# Patient Record
Sex: FEMALE | Race: WHITE
[De-identification: names, ages, dates, MRNs, and addresses within clinical notes are randomized per-mention and may not be internally consistent; named-entity substitution may affect disease eponyms.]

---

## 2017-05-17 ENCOUNTER — HOSPITAL ENCOUNTER (OUTPATIENT)
Dept: HOSPITAL 47 - RADXRYALE | Age: 22
Discharge: HOME | End: 2017-05-17
Attending: PHYSICIAN ASSISTANT
Payer: COMMERCIAL

## 2017-05-17 DIAGNOSIS — M22.41: Primary | ICD-10-CM

## 2017-05-18 NOTE — XR
EXAMINATION TYPE: XR knee complete RT

 

DATE OF EXAM: 5/17/2017 9:57 AM

 

COMPARISON: NONE

 

HISTORY: 22 year-old female right knee pain, chondromalacia

 

TECHNIQUE: 3 views

 

FINDINGS: 

Joint spaces are well-maintained. No acute fracture, subluxation, dislocation. Trace knee joint effus
ion may be present. Extensor mechanism is intact.

 

IMPRESSION: 

No acute osseous abnormality seen. There may be a trace knee joint effusion.

## 2018-05-22 ENCOUNTER — HOSPITAL ENCOUNTER (OUTPATIENT)
Dept: HOSPITAL 47 - FBPOP | Age: 23
Discharge: HOME | End: 2018-05-22
Payer: COMMERCIAL

## 2018-05-22 VITALS
TEMPERATURE: 97 F | HEART RATE: 92 BPM | SYSTOLIC BLOOD PRESSURE: 118 MMHG | RESPIRATION RATE: 18 BRPM | DIASTOLIC BLOOD PRESSURE: 65 MMHG

## 2018-05-22 DIAGNOSIS — O99.89: Primary | ICD-10-CM

## 2018-05-22 DIAGNOSIS — Z3A.21: ICD-10-CM

## 2018-05-22 DIAGNOSIS — R10.9: ICD-10-CM

## 2018-05-22 LAB
PH UR: 7.5 [PH] (ref 5–8)
RBC UR QL: 6 /HPF (ref 0–5)
SP GR UR: 1.01 (ref 1–1.03)
SQUAMOUS UR QL AUTO: 30 /HPF (ref 0–4)
UROBILINOGEN UR QL STRIP: <2 MG/DL (ref ?–2)
WBC #/AREA URNS HPF: 9 /HPF (ref 0–5)

## 2018-05-22 PROCEDURE — 99213 OFFICE O/P EST LOW 20 MIN: CPT

## 2018-05-22 PROCEDURE — 81001 URINALYSIS AUTO W/SCOPE: CPT

## 2018-05-22 PROCEDURE — 87086 URINE CULTURE/COLONY COUNT: CPT

## 2018-05-22 PROCEDURE — 59025 FETAL NON-STRESS TEST: CPT

## 2018-05-22 NOTE — P.MSEPDOC
Presenting Problems





- Arrival Data


Date of Arrival on Unit: 18


Time of Arrival on Unit: 10:29


Mode of Transport: Ambulatory





- Complaint


OB-Reason for Admission/Chief Complaint: Pain





Prenatal Medical History





- Pregnancy Information


: 2


Para: 0


Term: 0


: 0


Abortions: Spontaneous or Elective: 0


Number of Living Children: 0





- Gestational Age


Gestational Age by JAZLYN (wks/days): 21 Weeks and 4 Days





Review of Systems





- Review of Systems


Constitutional: No problems


Breast: No problems


ENT: No problems


Cardiovascular: No problems


Respiratory: No problems


Gastrointestinal: No problems


Genitourinary: No problems


Musculoskeletal: No problems


Neurological: No problems


Skin: No problems





Vital Signs





- Temperature


Temperature: 97.0 F


Temperature Source: Temporal Artery Scan





- Pulse


  ** Right Brachial


Pulse Rate: 92





- Respirations


Respiratory Rate: 18


Oxygen Delivery Method: Room Air





- Blood Pressure


  ** Right Arm


Blood Pressure: 118/65


Blood Pressure Mean: 82


Blood Pressure Source: Automatic Cuff





Medical Screen Scoring (Pre)





- Cervical Exam


Dilation: Exam Deferred


Effacement: Exam Deferred


Membranes: Intact





- Uterine Contractions


Frequency: N/A


Duration: N/A


Intensity: N/A





- Maternal Vital Signs


Maternal Temperature: N/A


Maternal Respirations: N/A





- Maternal Trauma


Maternal Trauma: N/A





- Fetal Assessment


Fetal Heart Rate - NICHD Category: Category I (Normal) = 0





- Total Score


Total Score (Pre): 0





- Level of Risk


Level of Risk: N/A





Physician Notification (Pre)





- Physician Notified


Physician Notified Date: 18


Physician Notified Time: 11:35


Physician/Practitioner Notifed:: Dr. Everett


Spoke With: Dr. Everett


New Order Received: Yes





- Notification Comment


Comment: send order for urine culture, may discharge pt home follow up in 

office at scheduled appt on  at scheduled appt





Medical Screen Scoring (Post)





- Cervical Exam


Dilation: 1-3 cm = 1


Effacement: More than 50% = 2


Membranes: Intact





- Uterine Contractions


Frequency: > 5 minutes apart = 1


Duration: > 40 seconds = 2


Intensity: N/A





- Maternal Vital Signs


Maternal Temperature: N/A


Maternal Blood Pressure: N/A


Signs of Preeclampsia: N/A





- Fetal Assessment


Fetal Heart Rate: 140


Fetal Heart Rate - NICHD Category: Category I (Normal) = 0


NST: Reactive


Fetal Position: N/A


Fetal Station: N/A





- Total Score


Total Score (Post): 6





- Post Treatment Level of Risk


Post Treatment Level of Risk: Medium (6-9)





Physician Notification (Post)





- Physician Notified


Physician Notified Date: 18


Physician Notified Time: 10:49


Physician/Practitioner Notified:: Dr. Yost


Spoke With: Dr. Yost


New Order Received: Yes





- Notification Comment


Comment: discharge pt home, follow up in office tomorrow at scheduled appt





Disposition





- Disposition


OB Disposition: Triage, Discharge to home, Written follow up instructions 

reviewed


Discharge Date: 18


Discharge Time: 11:50


I agree with the RN Medical Screening Exam: Yes


Risk & Benefit of care provided described in d/c instruction: Yes


Diagnosis: UNSPECIFIED ABDOMINAL PAIN

## 2018-05-25 ENCOUNTER — HOSPITAL ENCOUNTER (EMERGENCY)
Dept: HOSPITAL 47 - EC | Age: 23
Discharge: HOME | End: 2018-05-25
Payer: COMMERCIAL

## 2018-05-25 VITALS
RESPIRATION RATE: 18 BRPM | DIASTOLIC BLOOD PRESSURE: 58 MMHG | SYSTOLIC BLOOD PRESSURE: 119 MMHG | HEART RATE: 99 BPM | TEMPERATURE: 97.4 F

## 2018-05-25 DIAGNOSIS — Z88.1: ICD-10-CM

## 2018-05-25 DIAGNOSIS — Z3A.22: ICD-10-CM

## 2018-05-25 DIAGNOSIS — Z88.2: ICD-10-CM

## 2018-05-25 DIAGNOSIS — O99.512: Primary | ICD-10-CM

## 2018-05-25 DIAGNOSIS — Z88.8: ICD-10-CM

## 2018-05-25 DIAGNOSIS — J06.9: ICD-10-CM

## 2018-05-25 PROCEDURE — 99283 EMERGENCY DEPT VISIT LOW MDM: CPT

## 2018-05-25 PROCEDURE — 71046 X-RAY EXAM CHEST 2 VIEWS: CPT

## 2018-05-25 NOTE — ED
General Adult HPI





- General


Chief complaint: Upper Respiratory Infection


Stated complaint: Vomiting


Time Seen by Provider: 05/25/18 21:44


Source: patient, RN notes reviewed


Mode of arrival: ambulatory


Limitations: no limitations





- History of Present Illness


Initial comments: 





23-year-old female presents to the emergency room for a chief complaint of 

cough times one week. Patient is currently 22 weeks pregnant. Patient states 

she has been coughing up phlegm and has nasal congestion.  Patient states she 

has had low-grade fevers at home.  Patient states she also has a sore throat 

but has been swabbed for strep 3 days ago and was negative and does not want 

another swab.  Patient has been taking Robitussin and benadryl as prescribed by 

her family doctor.  Patient denies shortness of breath or difficulty breathing. 

Patient denies any pregnancy related complaints.  Patient has no other 

complaints at this time including shortness of breath, chest pain, abdominal 

pain, nausea or vomiting, headache, or visual changes. 





- Related Data


 Home Medications











 Medication  Instructions  Recorded  Confirmed


 


Albuterol Inhaler [Ventolin Hfa 1 puff INHALATION Q4H PRN 07/15/14 05/22/18





Inhaler]   


 


Pnv No.95/Ferrous Fum/Folic AC 1 each PO DAILY 05/22/18 05/22/18





[Prenatal Multivitamin Tablet]   











 Allergies











Allergy/AdvReac Type Severity Reaction Status Date / Time


 


methylphenidate HCl Allergy  Unknown Verified 05/25/18 21:43





[From Metadate CD]     


 


sulfamethoxazole Allergy  Rash/Hives Verified 05/25/18 21:43





[From Bactrim]     


 


trimethoprim [From Bactrim] Allergy  Rash/Hives Verified 05/25/18 21:43


 


cephalexin [From Keflex] AdvReac  Nausea & Verified 05/25/18 21:43





   Vomiting  














Review of Systems


ROS Statement: 


Those systems with pertinent positive or pertinent negative responses have been 

documented in the HPI.





ROS Other: All systems not noted in ROS Statement are negative.





Past Medical History


Past Medical History: No Reported History


Additional Past Medical History / Comment(s): hypoglycemia


History of Any Multi-Drug Resistant Organisms: None Reported


Past Surgical History: Tonsillectomy


Additional Past Surgical History / Comment(s): oral surgery


Past Psychological History: Anxiety


Smoking Status: Never smoker


Past Alcohol Use History: None Reported


Past Drug Use History: None Reported





General Exam


Limitations: no limitations


General appearance: alert, in no apparent distress


Eye exam: Present: normal appearance, PERRL, EOMI.  Absent: scleral icterus, 

conjunctival injection, periorbital swelling


ENT exam: Present: normal exam, normal oropharynx, mucous membranes moist, TM's 

normal bilaterally


Neck exam: Present: normal inspection, full ROM.  Absent: tenderness, 

meningismus, lymphadenopathy, thyromegaly


Respiratory exam: Present: normal lung sounds bilaterally.  Absent: respiratory 

distress, wheezes, rales, rhonchi, stridor


Cardiovascular Exam: Present: regular rate, normal rhythm, normal heart sounds.

  Absent: systolic murmur, diastolic murmur, rubs, gallop, clicks





Course


 Vital Signs











  05/25/18





  21:40


 


Temperature 97.4 F L


 


Pulse Rate 99


 


Respiratory 18





Rate 


 


Blood Pressure 119/58


 


O2 Sat by Pulse 99





Oximetry 














Medical Decision Making





- Medical Decision Making





23-year-old female presents to the emergency department for a chief complaint 

of productive cough for one week. patient is 22 weeks pregnant. Patient also 

has a sore throat but was already swabbed for strep which was negative.  She 

does not want to be swabbed again.  No fevers in the emergency department.  On 

exam lungs are clear.  Throat is nonerythematous.  Patient appears sightly 

congested.  No redness or swelling in the facial sinuses.  Patient has been 

taking Robitussin and Benadryl.  Patient is upset because she is missing work.  

X-ray was ordered of the chest which shows no acute abnormalities or 

pneumonias.  Patient was shielded.  Patient was educated that this is likely an 

upper respiratory viral infection which may last for up to another week.  

Patient is upset we are not giving her an antibiotic, as her other doctor also 

did not give her one.  However, at this point antibiotics are not indicated.  I 

explained to her that we are trying to prevent exposure to the fetus of 

unnecessary medications and she is appreciative of that. patient will continue 

benadryl and robitussin given by another provider. Patient agrees to follow up 

with OB/GYN.  She will return to the emergency Department if she has any 

worsening symptoms or high fevers.





Disposition


Clinical Impression: 


 Upper respiratory infection





Disposition: HOME SELF-CARE


Condition: Good


Instructions:  Upper Respiratory Infection (ED)


Additional Instructions: 


Please take medications given to by her OB/GYN.  Please return to the emergency 

department if symptoms worsen or you develop high fevers.  Otherwise follow-up 

with OB/GYN.


Is patient prescribed a controlled substance at d/c from ED?: No


Referrals: 


Ilia Holt DO [Primary Care Provider] - 1-2 days


Time of Disposition: 22:42

## 2018-05-25 NOTE — XR
EXAMINATION TYPE: XR chest 2V

 

DATE OF EXAM: 5/25/2018

 

COMPARISON: NONE

 

HISTORY: Cough and congestion

 

TECHNIQUE:  Frontal and lateral views of the chest are obtained.

 

FINDINGS:  Heart and mediastinum are normal. Lungs are clear. Diaphragm is normal. Bony thorax appear
s normal.

 

IMPRESSION:  Normal chest

## 2018-08-03 ENCOUNTER — HOSPITAL ENCOUNTER (OUTPATIENT)
Dept: HOSPITAL 47 - FBPOP | Age: 23
Discharge: HOME | End: 2018-08-03
Attending: OBSTETRICS & GYNECOLOGY
Payer: COMMERCIAL

## 2018-08-03 VITALS
TEMPERATURE: 97.8 F | SYSTOLIC BLOOD PRESSURE: 125 MMHG | HEART RATE: 91 BPM | RESPIRATION RATE: 17 BRPM | DIASTOLIC BLOOD PRESSURE: 70 MMHG

## 2018-08-03 DIAGNOSIS — O47.03: Primary | ICD-10-CM

## 2018-08-03 DIAGNOSIS — O99.333: ICD-10-CM

## 2018-08-03 DIAGNOSIS — Z3A.32: ICD-10-CM

## 2018-08-03 LAB
PH UR: 7.5 [PH] (ref 5–8)
RBC UR QL: 1 /HPF (ref 0–5)
SP GR UR: 1.01 (ref 1–1.03)
SQUAMOUS UR QL AUTO: 18 /HPF (ref 0–4)
UROBILINOGEN UR QL STRIP: <2 MG/DL (ref ?–2)
WBC #/AREA URNS HPF: <1 /HPF (ref 0–5)

## 2018-08-03 PROCEDURE — 81001 URINALYSIS AUTO W/SCOPE: CPT

## 2018-08-04 NOTE — P.MSEPDOC
Presenting Problems





- Arrival Data


Date of Arrival on Unit: 18


Time of Arrival on Unit: 15:18


Mode of Transport: Ambulatory





- Complaint


OB-Reason for Admission/Chief Complaint: Rule Out PROM


Comment: pt here with c/o large gush of fluid after sneezing around 1430 today, 

pt.  unsure if it was urine or rom, pt reports + fm, denies vb, denies 

complications with.  pregnancy, reports occasional cramping in lower abd late 

began today, pt unsure if they.  are contractions reporting they only last a 

few seconds when they occur, pt denies.  intercourse in last 24 hours





Prenatal Medical History





- Pregnancy Information


: 2


Para: 0


Term: 0


: 0


Abortions: Spontaneous or Elective: 1


Number of Living Children: 0





- Gestational Age


Gestational Age by JAZLYN (wks/days): 32 Weeks and 0 Days





- Prenatal History


Pregnancy Complications: Smoker





Review of Systems





- Review of Systems


Constitutional: No problems


Breast: No problems


ENT: No problems


Cardiovascular: No problems


Respiratory: No problems


Gastrointestinal: No problems


Genitourinary: No problems


Musculoskeletal: No problems


Neurological: No problems


Skin: No problems





Vital Signs





- Temperature


Temperature: 97.8 F


Temperature Source: Temporal Artery Scan





- Pulse


  ** Right Brachial


Pulse Rate: 91


Pulse Assessment Method: Automatic Cuff





- Respirations


Respiratory Rate: 17


Oxygen Delivery Method: Room Air


O2 Sat by Pulse Oximetry: 97





- Blood Pressure


  ** Right Arm


Blood Pressure: 125/70


Blood Pressure Mean: 88


Blood Pressure Source: Automatic Cuff





Medical Screen Scoring (Pre)





- Cervical Exam


Dilation: 0 cm = 0


Effacement: Exam Deferred


Membranes: Intact





- Uterine Contractions


Frequency: N/A


Duration: N/A


Intensity: N/A





- Maternal Vital Signs


Maternal Temperature: N/A


Maternal Blood Pressure: N/A


Signs of Preeclampsia: N/A


Maternal Respirations: N/A





- Pain Assessment


Pain Location and Character: Abdomen


Pain Scale Used: Numeric (1 - 10)


Pain Intensity: 4


Pain Management Goal: 3


Pain Description: *Acute, Aching, Crushing


Pain Radiation Location: none


Pain Frequency: Occasional


Pain Duration: 1


Pain Duration Units: Hours


Pain Behavior: None Exhibited


Pain Aggravating Factors: None





- Maternal Trauma


Maternal Trauma: N/A





- Fetal Assessment


Baseline FHR: 130


Fetal Heart Rate - NICHD Category: Category I (Normal) = 0


NST: Reactive


Fetal Position: N/A


Fetal Station: N/A





- Total Score


Total Score (Pre): 0





- Level of Risk


Level of Risk: Low (0-5)





Physician Notification (Pre)





- Physician Notified


Physician Notified Date: 18


Physician Notified Time: 16:12


Physician/Practitioner Notifed:: Dr Martinez


Spoke With: Dr Martinez


New Order Received: Yes (discharge home)





- Notification Comment


Comment: amnisure results -, u/a results -, pt reports decrease in pain at this 

time





Disposition





- Disposition


OB Disposition: Discharge to home, Written follow up instructions reviewed


Discharge Date: 18


Discharge Time: 16:20


I agree with the RN Medical Screening Exam: Yes


Risk & Benefit of care provided described in d/c instruction: Yes


Diagnosis: FALSE LABOR BEFORE 37 COMPLETED WEEKS OF GEST, THIRD TRI

## 2018-09-12 ENCOUNTER — HOSPITAL ENCOUNTER (INPATIENT)
Dept: HOSPITAL 47 - 4FBP | Age: 23
LOS: 2 days | Discharge: HOME | End: 2018-09-14
Attending: OBSTETRICS & GYNECOLOGY | Admitting: OBSTETRICS & GYNECOLOGY
Payer: COMMERCIAL

## 2018-09-12 VITALS — BODY MASS INDEX: 27.4 KG/M2

## 2018-09-12 DIAGNOSIS — Z88.8: ICD-10-CM

## 2018-09-12 DIAGNOSIS — Z88.2: ICD-10-CM

## 2018-09-12 DIAGNOSIS — Z83.49: ICD-10-CM

## 2018-09-12 DIAGNOSIS — K83.1: ICD-10-CM

## 2018-09-12 DIAGNOSIS — F41.9: ICD-10-CM

## 2018-09-12 DIAGNOSIS — Z3A.37: ICD-10-CM

## 2018-09-12 DIAGNOSIS — Z82.5: ICD-10-CM

## 2018-09-12 DIAGNOSIS — Z88.1: ICD-10-CM

## 2018-09-12 LAB
BASOPHILS # BLD AUTO: 0.1 K/UL (ref 0–0.2)
BASOPHILS NFR BLD AUTO: 1 %
EOSINOPHIL # BLD AUTO: 0.2 K/UL (ref 0–0.7)
EOSINOPHIL NFR BLD AUTO: 2 %
ERYTHROCYTE [DISTWIDTH] IN BLOOD BY AUTOMATED COUNT: 4 M/UL (ref 3.8–5.4)
ERYTHROCYTE [DISTWIDTH] IN BLOOD: 13.8 % (ref 11.5–15.5)
HCT VFR BLD AUTO: 35.4 % (ref 34–46)
HGB BLD-MCNC: 11.2 GM/DL (ref 11.4–16)
LYMPHOCYTES # SPEC AUTO: 2.4 K/UL (ref 1–4.8)
LYMPHOCYTES NFR SPEC AUTO: 23 %
MCH RBC QN AUTO: 28 PG (ref 25–35)
MCHC RBC AUTO-ENTMCNC: 31.6 G/DL (ref 31–37)
MCV RBC AUTO: 88.5 FL (ref 80–100)
MONOCYTES # BLD AUTO: 0.8 K/UL (ref 0–1)
MONOCYTES NFR BLD AUTO: 7 %
NEUTROPHILS # BLD AUTO: 6.8 K/UL (ref 1.3–7.7)
NEUTROPHILS NFR BLD AUTO: 65 %
PLATELET # BLD AUTO: 214 K/UL (ref 150–450)
WBC # BLD AUTO: 10.4 K/UL (ref 3.8–10.6)

## 2018-09-12 PROCEDURE — 85027 COMPLETE CBC AUTOMATED: CPT

## 2018-09-12 PROCEDURE — 85025 COMPLETE CBC W/AUTO DIFF WBC: CPT

## 2018-09-12 RX ADMIN — BUTORPHANOL TARTRATE PRN MG: 1 INJECTION, SOLUTION INTRAMUSCULAR; INTRAVENOUS at 15:17

## 2018-09-12 RX ADMIN — AMPICILLIN SODIUM SCH MLS/HR: 1 INJECTION, POWDER, FOR SOLUTION INTRAMUSCULAR; INTRAVENOUS at 10:56

## 2018-09-12 RX ADMIN — POTASSIUM CHLORIDE SCH MLS/HR: 14.9 INJECTION, SOLUTION INTRAVENOUS at 21:54

## 2018-09-12 RX ADMIN — BUTORPHANOL TARTRATE PRN MG: 1 INJECTION, SOLUTION INTRAMUSCULAR; INTRAVENOUS at 12:33

## 2018-09-12 RX ADMIN — AMPICILLIN SODIUM SCH MLS/HR: 1 INJECTION, POWDER, FOR SOLUTION INTRAMUSCULAR; INTRAVENOUS at 14:55

## 2018-09-12 RX ADMIN — POTASSIUM CHLORIDE SCH MLS/HR: 14.9 INJECTION, SOLUTION INTRAVENOUS at 16:58

## 2018-09-12 RX ADMIN — AMPICILLIN SODIUM SCH MLS/HR: 1 INJECTION, POWDER, FOR SOLUTION INTRAMUSCULAR; INTRAVENOUS at 19:17

## 2018-09-12 RX ADMIN — POTASSIUM CHLORIDE SCH MLS/HR: 14.9 INJECTION, SOLUTION INTRAVENOUS at 10:51

## 2018-09-12 RX ADMIN — POTASSIUM CHLORIDE SCH MLS/HR: 14.9 INJECTION, SOLUTION INTRAVENOUS at 06:30

## 2018-09-12 NOTE — P.HPOB
History of Present Illness


H&P Date: 18


Chief Complaint: Induction of labor





23-year-old  presents at 37 weeks and 5 days for induction of labor due to 

cholestasis of pregnancy.  Her cervix is 1-2 cm dilated, 70% effaced, -2 

station.  She is neville irregularly.  Fetal heart tones were in 130-135 

with moderate variability and reactive. 





Review of Systems


All systems: negative


Constitutional: Denies chills, Denies fever


Eyes: denies blurred vision, denies pain


Ears, nose, mouth and throat: Denies headache, Denies sore throat


Cardiovascular: Denies chest pain, Denies shortness of breath


Respiratory: Denies cough


Gastrointestinal: Denies abdominal pain, Denies diarrhea, Denies nausea, Denies 

vomiting


Genitourinary: Denies dysuria, Denies hematuria


Musculoskeletal: Denies myalgias


Integumentary: Denies pruritus, Denies rash


Neurological: Denies numbness, Denies weakness


Psychiatric: Denies anxiety, Denies depression


Endocrine: Denies fatigue, Denies weight change





Past Medical History


Past Medical History: No Reported History


Additional Past Medical History / Comment(s): hypoglycemia, obstetrics history: 

This is her first pregnancy.  She's had prenatal care with me since the first 

trimester.  She weaned her for Adderall in the first couple weeks of her 

pregnancy.  She was diagnosed with cholestasis of pregnancy at 32 weeks 

gestation.


History of Any Multi-Drug Resistant Organisms: None Reported


Past Surgical History: Tonsillectomy


Additional Past Surgical History / Comment(s): oral surgery


Past Anesthesia/Blood Transfusion Reactions: No Reported Reaction


Past Psychological History: Anxiety


Smoking Status: Never smoker


Past Alcohol Use History: None Reported


Past Drug Use History: None Reported





- Past Family History


  ** Mother


Family Medical History: Asthma, Thyroid Disorder





Medications and Allergies


 Home Medications











 Medication  Instructions  Recorded  Confirmed  Type


 


Pnv No.95/Ferrous Fum/Folic AC 1 tab PO DAILY 18 History





[Prenatal Multivitamin Tablet]    


 


Ranitidine HCl [Zantac] 150 mg PO BID 18 History


 


Ursodiol 300 mg PO BID 18 History


 


diphenhydrAMINE [Benadryl] 25 mg PO AS DIRECTED 18 History











 Allergies











Allergy/AdvReac Type Severity Reaction Status Date / Time


 


methylphenidate HCl Allergy  Unknown Verified 18 06:26





[From Metadate CD]     


 


sulfamethoxazole Allergy  Rash/Hives Verified 18 06:26





[From Bactrim]     


 


trimethoprim [From Bactrim] Allergy  Rash/Hives Verified 18 06:26


 


cephalexin [From Keflex] AdvReac  Nausea & Verified 18 06:26





   Vomiting  














Exam


Osteopathic Statement: *.  No significant issues noted on an osteopathic 

structural exam other than those noted in the History and Physical/Consult.


 Vital Signs











  Temp Pulse Resp BP Pulse Ox


 


 18 06:27  97.6 F  83  16  142/96  98








 Intake and Output











 18





 22:59 06:59 14:59


 


Other:   


 


  Weight  68.039 kg 














Heart: Regular rate and rhythm


Lungs: Clear to auscultation bilaterally


Abdomen: Soft, nontender


Extremities: Negative Homans sign





Results


Result Diagrams: 


 18 06:25





 Abnormal Lab Results - Last 24 Hours (Table)











  18 Range/Units





  06:25 


 


Hgb  11.2 L  (11.4-16.0)  gm/dL














Assessment and Plan


(1) Normal labor


Current Visit: Yes   Status: Acute   Code(s): O80 - ENCOUNTER FOR FULL-TERM 

UNCOMPLICATED DELIVERY; Z37.9 - OUTCOME OF DELIVERY, UNSPECIFIED   SNOMED Code(s

): 25866696


   


Plan: 





1. Induction of labor with amniotomy and Pitocin


2.  Anticipate normal vaginal delivery


3.  Antibiotics for GBS prophylaxis

## 2018-09-13 LAB
ERYTHROCYTE [DISTWIDTH] IN BLOOD BY AUTOMATED COUNT: 3.21 M/UL (ref 3.8–5.4)
ERYTHROCYTE [DISTWIDTH] IN BLOOD: 13.7 % (ref 11.5–15.5)
HCT VFR BLD AUTO: 28.2 % (ref 34–46)
HGB BLD-MCNC: 9.2 GM/DL (ref 11.4–16)
MCH RBC QN AUTO: 28.8 PG (ref 25–35)
MCHC RBC AUTO-ENTMCNC: 32.7 G/DL (ref 31–37)
MCV RBC AUTO: 88 FL (ref 80–100)
PLATELET # BLD AUTO: 190 K/UL (ref 150–450)
WBC # BLD AUTO: 19 K/UL (ref 3.8–10.6)

## 2018-09-13 PROCEDURE — 0KQM0ZZ REPAIR PERINEUM MUSCLE, OPEN APPROACH: ICD-10-PCS

## 2018-09-13 PROCEDURE — 3E033VJ INTRODUCTION OF OTHER HORMONE INTO PERIPHERAL VEIN, PERCUTANEOUS APPROACH: ICD-10-PCS

## 2018-09-13 PROCEDURE — 3E0R3BZ INTRODUCTION OF ANESTHETIC AGENT INTO SPINAL CANAL, PERCUTANEOUS APPROACH: ICD-10-PCS

## 2018-09-13 PROCEDURE — 10907ZC DRAINAGE OF AMNIOTIC FLUID, THERAPEUTIC FROM PRODUCTS OF CONCEPTION, VIA NATURAL OR ARTIFICIAL OPENING: ICD-10-PCS

## 2018-09-13 PROCEDURE — 00HU33Z INSERTION OF INFUSION DEVICE INTO SPINAL CANAL, PERCUTANEOUS APPROACH: ICD-10-PCS

## 2018-09-13 RX ADMIN — DOCUSATE SODIUM AND SENNOSIDES SCH EACH: 50; 8.6 TABLET ORAL at 20:15

## 2018-09-13 RX ADMIN — URSODIOL SCH MG: 300 CAPSULE ORAL at 19:54

## 2018-09-13 RX ADMIN — AMPICILLIN SODIUM SCH: 1 INJECTION, POWDER, FOR SOLUTION INTRAMUSCULAR; INTRAVENOUS at 02:10

## 2018-09-13 RX ADMIN — IBUPROFEN PRN MG: 600 TABLET ORAL at 07:46

## 2018-09-13 RX ADMIN — DOCUSATE SODIUM AND SENNOSIDES SCH EACH: 50; 8.6 TABLET ORAL at 07:46

## 2018-09-13 RX ADMIN — IBUPROFEN PRN MG: 600 TABLET ORAL at 20:15

## 2018-09-13 RX ADMIN — IBUPROFEN PRN MG: 600 TABLET ORAL at 14:05

## 2018-09-13 RX ADMIN — IBUPROFEN PRN MG: 600 TABLET ORAL at 02:05

## 2018-09-13 NOTE — P.PROBDLV
Vaginal Delivery Note





- .


Vaginal Delivery Note: 





23-year-old  presents at 37 weeks and 5 days for induction of labor due to 

cholestasis of pregnancy.  Her cervix was 1-2 cm dilated, 80% effaced, and -2 

station.  She was neville irregularly.  Fetal heart tones 130-135 with 

moderate variability and reactive.  Amniotomy was performed at 7:45 AM, clear 

fluid noted.  Pitocin had been started.  Her cervix changed slowly through the 

day.  She did get an epidural around 5 PM, when she was 4 cm dilated.  Her 

cervix was completely dilated at 12:17 AM.  She pushed, and delivered a viable 

male infant over intact perineum under epidural anesthesia at 12:44 AM.  Head 

delivered OA, anterior shoulder delivered gentle downward guidance followed by 

posterior shoulder and rest of body.  Nose and mouth bulb suctioned, cord 

clamped and cut, infant placed mother's abdomen.  Apgars 9, 9, weight 7 lbs. 2 

oz.  Placenta delivered spontaneously, intact with three-vessel cord.  Vagina, 

cervix, perineum inspected.  Second-degree midline laceration was repaired with 

2-0 Vicryl.  Estimated blood loss 200 mL.  Mother and baby in stable condition.

## 2018-09-14 VITALS
DIASTOLIC BLOOD PRESSURE: 78 MMHG | RESPIRATION RATE: 16 BRPM | TEMPERATURE: 98.1 F | SYSTOLIC BLOOD PRESSURE: 135 MMHG | HEART RATE: 91 BPM

## 2018-09-14 RX ADMIN — DOCUSATE SODIUM AND SENNOSIDES SCH EACH: 50; 8.6 TABLET ORAL at 08:25

## 2018-09-14 RX ADMIN — URSODIOL SCH MG: 300 CAPSULE ORAL at 08:25

## 2018-09-17 NOTE — P.DS
Providers


Date of admission: 


09/12/18 05:55





Expected date of discharge: 09/14/18


Attending physician: 


Guadalupe Everett





Primary care physician: 


Stated None








- Discharge Diagnosis(es)


(1) Normal labor


Status: Resolved   





(2) Normal vaginal delivery


Status: Acute   


Hospital Course: 





Patient presented for induction of labor.  She underwent a normal vaginal 

delivery.  Postpartum she did have some uterine atony and vaginal bleeding.  

She received 1 dose of Methergine.  Her hemoglobin went from 11.2-9.2.  Her 

anemia was asymptomatic.  She will be discharged home postpartum day #1 in 

stable condition to follow-up with me in 6 weeks.


Patient Condition at Discharge: Good





Plan - Discharge Summary


New Discharge Prescriptions: 


New


   Ibuprofen [Motrin] 600 mg PO Q6HR PRN #30 tab


     PRN Reason: Mild Pain Or Fever >= 100.5





No Action


   Ursodiol 300 mg PO BID


   Ranitidine HCl [Zantac] 150 mg PO BID


   Pnv No.95/Ferrous Fum/Folic AC [Prenatal Multivitamin Tablet] 1 tab PO DAILY


   diphenhydrAMINE [Benadryl] 25 mg PO AS DIRECTED


Discharge Medication List





Pnv No.95/Ferrous Fum/Folic AC [Prenatal Multivitamin Tablet] 1 tab PO DAILY 08/ 26/18 [History]


Ranitidine HCl [Zantac] 150 mg PO BID 08/26/18 [History]


Ursodiol 300 mg PO BID 08/26/18 [History]


diphenhydrAMINE [Benadryl] 25 mg PO AS DIRECTED 08/26/18 [History]


Ibuprofen [Motrin] 600 mg PO Q6HR PRN #30 tab 09/14/18 [Rx]








Follow up Appointment(s)/Referral(s): 


Guadalupe Everett DO [Doctor of Osteopathic Medicine] - 6 Weeks


Discharge Disposition: HOME SELF-CARE

## 2018-09-18 ENCOUNTER — HOSPITAL ENCOUNTER (EMERGENCY)
Dept: HOSPITAL 47 - EC | Age: 23
Discharge: HOME | End: 2018-09-18
Payer: COMMERCIAL

## 2018-09-18 VITALS — DIASTOLIC BLOOD PRESSURE: 67 MMHG | TEMPERATURE: 97.1 F | SYSTOLIC BLOOD PRESSURE: 120 MMHG | HEART RATE: 85 BPM

## 2018-09-18 VITALS — RESPIRATION RATE: 18 BRPM

## 2018-09-18 DIAGNOSIS — Z88.1: ICD-10-CM

## 2018-09-18 DIAGNOSIS — Z88.2: ICD-10-CM

## 2018-09-18 DIAGNOSIS — M79.604: Primary | ICD-10-CM

## 2018-09-18 DIAGNOSIS — M79.661: ICD-10-CM

## 2018-09-18 DIAGNOSIS — M79.662: ICD-10-CM

## 2018-09-18 DIAGNOSIS — Z88.8: ICD-10-CM

## 2018-09-18 DIAGNOSIS — M79.652: ICD-10-CM

## 2018-09-18 DIAGNOSIS — Z79.899: ICD-10-CM

## 2018-09-18 PROCEDURE — 99283 EMERGENCY DEPT VISIT LOW MDM: CPT

## 2018-09-18 NOTE — ED
Extremity Problem HPI





- General


Chief complaint: Extremity Problem,Nontraumatic


Stated complaint: poss blood clot


Time Seen by Provider: 09/18/18 14:11


Source: patient, RN notes reviewed


Mode of arrival: ambulatory


Limitations: no limitations





- History of Present Illness


Initial comments: 


This is a 23-year-old female who presents to the emergency department with 

chief complaint of possible blood clot.  Patient states that she had a vaginal 

delivery last Thursday.  She states that since that time she has been 

experiencing sharp pains in both of her calves.  She states that this is normal 

for her.  However, she states that she is also experiencing a sharp stabbing 

pain that comes and goes in her left inner thigh.  She states that there is an 

area of tenderness.  She states that the pain lasts anywhere from 2 minutes at 

a time to 5 minutes at a time.  She states that she contacted her OB/GYN's 

office and they suggested she reports the emergency department for an 

ultrasound to rule out blood clot.  Patient denies any recent fevers or chills, 

chest pain shortness of breath, abdominal pain, nausea or vomiting.  Patient is 

not taking birth control as she is breast-feeding.








- Related Data


 Home Medications











 Medication  Instructions  Recorded  Confirmed


 


Pnv No.95/Ferrous Fum/Folic AC 1 tab PO DAILY 08/26/18 09/12/18





[Prenatal Multivitamin Tablet]   


 


Ranitidine HCl [Zantac] 150 mg PO BID 08/26/18 09/12/18


 


Ursodiol 300 mg PO BID 08/26/18 09/12/18


 


diphenhydrAMINE [Benadryl] 25 mg PO AS DIRECTED 08/26/18 09/12/18








 Previous Rx's











 Medication  Instructions  Recorded


 


Ibuprofen [Motrin] 600 mg PO Q6HR PRN #30 tab 09/14/18











 Allergies











Allergy/AdvReac Type Severity Reaction Status Date / Time


 


methylphenidate HCl Allergy  Unknown Verified 09/18/18 14:03





[From Metadate CD]     


 


sulfamethoxazole Allergy  Rash/Hives Verified 09/18/18 14:03





[From Bactrim]     


 


trimethoprim [From Bactrim] Allergy  Rash/Hives Verified 09/18/18 14:03


 


cephalexin [From Keflex] AdvReac  Nausea & Verified 09/18/18 14:03





   Vomiting  














Review of Systems


ROS Statement: 


Those systems with pertinent positive or pertinent negative responses have been 

documented in the HPI.





ROS Other: All systems not noted in ROS Statement are negative.





Past Medical History


Past Medical History: No Reported History


Additional Past Medical History / Comment(s): hypoglycemia, obstetrics history: 

This is her first pregnancy.  She's had prenatal care with me since the first 

trimester.  She weaned her for Adderall in the first couple weeks of her 

pregnancy.  She was diagnosed with cholestasis of pregnancy at 32 weeks 

gestation.


History of Any Multi-Drug Resistant Organisms: None Reported


Past Surgical History: Tonsillectomy


Additional Past Surgical History / Comment(s): oral surgery


Past Anesthesia/Blood Transfusion Reactions: No Reported Reaction


Past Psychological History: Anxiety


Smoking Status: Never smoker


Past Alcohol Use History: None Reported


Past Drug Use History: None Reported





- Past Family History


  ** Mother


Family Medical History: Asthma, Thyroid Disorder





General Exam





- General Exam Comments


Initial Comments: 


General: Awake and alert, well-developed; in no apparent distress.


HEENT: Head atraumatic, normocephalic. Pupils are equal, round and reactive to 

light. Extraocular movements intact. Oropharynx moist without erythema or 

exudate. 


Neck: Supple. Normal ROM. 


Cardiovascular: Regular rate and rhythm. No murmurs, rubs or gallops. Chest 

symmetrical.  


Respiratory: Lungs clear to auscultation bilaterally. No wheezes, rales or 

rhonchi. Normal respiratory effort with no use of accessory muscles. 


Musculoskeletal: Normal ROM, no tenderness bilateral upper and lower 

extremities.  Negative Homans sign.  No calf tenderness bilaterally.  No 

swelling, erythema or ecchymosis of bilateral lower extremities.  Ambulating 

normally. 


Skin: Pink, warm and dry without rashes or lesions. 


Neurological: Alert and oriented x3. CN II-XII grossly intact. Speech is fluent 

and answers are appropriate. No focal neuro deficits. 


Psychiatric: Normal mood and affect. No overt signs of depression or anxiety 

noted. 














Limitations: no limitations





Course


 Vital Signs











  09/18/18 09/18/18





  13:59 15:45


 


Temperature 98.0 F 97.1 F L


 


Pulse Rate 98 85


 


Respiratory 18 18





Rate  


 


Blood Pressure 128/72 120/67


 


O2 Sat by Pulse 100 100





Oximetry  














Medical Decision Making





- Medical Decision Making


This is a 23-year-old female who presents to the emergency department with 

chief complaint of possible blood clot.  Patient's OB/GYN office recommended 

she come to the emergency department for evaluation of possible blood clot.  

Patient had vaginal delivery last Thursday and has been having a pain in her 

left inner thigh.  Ultrasound was obtained which revealed no evidence for an 

acute DVT.  Her vital signs are stable and she is in no acute distress.  She 

will be discharged home at this time.  She is in agreement and voices 

understanding.  All questions answered.








- Radiology Data


Radiology results: report reviewed


Ultrasound venous Doppler duplex left lower extremity impression: No evident 

deep venous thrombosis at or above the left knee.





Disposition


Clinical Impression: 


 Right leg pain





Disposition: HOME SELF-CARE


Condition: Good


Instructions:  Leg Pain (ED)


Additional Instructions: 


Please follow up with primary care provider within 1-2 days. Return to 

emergency department if symptoms should worsen or any concerns arise. 


Is patient prescribed a controlled substance at d/c from ED?: No


Referrals: 


Ilia Holt DO [Primary Care Provider] - 1-2 days


Time of Disposition: 15:30

## 2018-09-18 NOTE — US
EXAMINATION TYPE: US venous doppler duplex LE LT

 

DATE OF EXAM: 9/18/2018 3:10 PM

 

COMPARISON: NONE

 

CLINICAL HISTORY: Pain.

 

SIDE PERFORMED: Left  

 

TECHNIQUE:  The lower extremity deep venous system is examined utilizing real time linear array sonog
yandel with graded compression, doppler sonography and color-flow sonography.

 

VESSELS IMAGED:

External Iliac Vein (EIV)

Common Femoral Vein

Deep Femoral Vein

Greater Saphenous Vein *

Femoral Vein

Popliteal Vein

Small Saphenous Vein *

Proximal Calf Veins

(* superficial vessels)

 

There is normal flow, compressibility, vascular waveforms.

 

 

 

Left Leg:  Negative for DVT

 

 

 

IMPRESSION:   No evident deep venous thrombosis at or above the left knee

## 2019-09-12 ENCOUNTER — HOSPITAL ENCOUNTER (OUTPATIENT)
Dept: HOSPITAL 47 - RADUSWWP | Age: 24
Discharge: HOME | End: 2019-09-12
Attending: FAMILY MEDICINE
Payer: COMMERCIAL

## 2019-09-12 DIAGNOSIS — R94.5: Primary | ICD-10-CM

## 2019-09-12 PROCEDURE — 76705 ECHO EXAM OF ABDOMEN: CPT

## 2019-09-12 NOTE — US
EXAMINATION TYPE: US abdomen limited

 

DATE OF EXAM: 9/12/2019

 

COMPARISON: CT 2013

 

CLINICAL HISTORY: R945 ABN LIVER FUNCTIONS.

 

EXAM MEASUREMENTS:

 

Liver Length:  13.8 cm   

Gallbladder Wall:  0.1 cm   

CBD:  0.2 cm

Right Kidney:  10.3 x 4.4 x 3.7 cm

 

 

 

Pancreas:  wnl

Liver:  wnl  

Gallbladder:  wnl

**Evidence for sonographic Patel's sign:  No

CBD:  wnl 

Right Kidney:  wnl 

 

 

 

IMPRESSION: No suspicious intrahepatic mass or intrahepatic ductal dilatation.

## 2019-10-12 ENCOUNTER — HOSPITAL ENCOUNTER (EMERGENCY)
Dept: HOSPITAL 47 - EC | Age: 24
Discharge: HOME | End: 2019-10-12
Payer: COMMERCIAL

## 2019-10-12 VITALS
HEART RATE: 100 BPM | SYSTOLIC BLOOD PRESSURE: 128 MMHG | TEMPERATURE: 98.2 F | DIASTOLIC BLOOD PRESSURE: 77 MMHG | RESPIRATION RATE: 18 BRPM

## 2019-10-12 DIAGNOSIS — Z79.899: ICD-10-CM

## 2019-10-12 DIAGNOSIS — Z79.3: ICD-10-CM

## 2019-10-12 DIAGNOSIS — R31.9: Primary | ICD-10-CM

## 2019-10-12 DIAGNOSIS — Z88.1: ICD-10-CM

## 2019-10-12 DIAGNOSIS — F17.200: ICD-10-CM

## 2019-10-12 DIAGNOSIS — Z87.440: ICD-10-CM

## 2019-10-12 DIAGNOSIS — Z88.2: ICD-10-CM

## 2019-10-12 DIAGNOSIS — Z88.8: ICD-10-CM

## 2019-10-12 DIAGNOSIS — R10.9: ICD-10-CM

## 2019-10-12 LAB
ANION GAP SERPL CALC-SCNC: 8 MMOL/L
BASOPHILS # BLD AUTO: 0.1 K/UL (ref 0–0.2)
BASOPHILS NFR BLD AUTO: 1 %
BUN SERPL-SCNC: 19 MG/DL (ref 7–17)
CALCIUM SPEC-MCNC: 9.6 MG/DL (ref 8.4–10.2)
CHLORIDE SERPL-SCNC: 102 MMOL/L (ref 98–107)
CO2 SERPL-SCNC: 28 MMOL/L (ref 22–30)
EOSINOPHIL # BLD AUTO: 0.2 K/UL (ref 0–0.7)
EOSINOPHIL NFR BLD AUTO: 3 %
ERYTHROCYTE [DISTWIDTH] IN BLOOD BY AUTOMATED COUNT: 4.86 M/UL (ref 3.8–5.4)
ERYTHROCYTE [DISTWIDTH] IN BLOOD: 13 % (ref 11.5–15.5)
GLUCOSE SERPL-MCNC: 87 MG/DL (ref 74–99)
HCT VFR BLD AUTO: 42.2 % (ref 34–46)
HGB BLD-MCNC: 13.9 GM/DL (ref 11.4–16)
LYMPHOCYTES # SPEC AUTO: 2.6 K/UL (ref 1–4.8)
LYMPHOCYTES NFR SPEC AUTO: 35 %
MCH RBC QN AUTO: 28.7 PG (ref 25–35)
MCHC RBC AUTO-ENTMCNC: 33 G/DL (ref 31–37)
MCV RBC AUTO: 86.9 FL (ref 80–100)
MONOCYTES # BLD AUTO: 0.6 K/UL (ref 0–1)
MONOCYTES NFR BLD AUTO: 8 %
NEUTROPHILS # BLD AUTO: 3.8 K/UL (ref 1.3–7.7)
NEUTROPHILS NFR BLD AUTO: 50 %
PH UR: 6.5 [PH] (ref 5–8)
PLATELET # BLD AUTO: 283 K/UL (ref 150–450)
POTASSIUM SERPL-SCNC: 4.3 MMOL/L (ref 3.5–5.1)
RBC UR QL: <1 /HPF (ref 0–5)
SODIUM SERPL-SCNC: 138 MMOL/L (ref 137–145)
SP GR UR: 1 (ref 1–1.03)
SQUAMOUS UR QL AUTO: <1 /HPF (ref 0–4)
UROBILINOGEN UR QL STRIP: <2 MG/DL (ref ?–2)
WBC # BLD AUTO: 7.6 K/UL (ref 3.8–10.6)

## 2019-10-12 PROCEDURE — 99284 EMERGENCY DEPT VISIT MOD MDM: CPT

## 2019-10-12 PROCEDURE — 81001 URINALYSIS AUTO W/SCOPE: CPT

## 2019-10-12 PROCEDURE — 81025 URINE PREGNANCY TEST: CPT

## 2019-10-12 PROCEDURE — 74018 RADEX ABDOMEN 1 VIEW: CPT

## 2019-10-12 PROCEDURE — 85025 COMPLETE CBC W/AUTO DIFF WBC: CPT

## 2019-10-12 PROCEDURE — 80048 BASIC METABOLIC PNL TOTAL CA: CPT

## 2019-10-12 PROCEDURE — 36415 COLL VENOUS BLD VENIPUNCTURE: CPT

## 2019-10-12 PROCEDURE — 76770 US EXAM ABDO BACK WALL COMP: CPT

## 2019-10-12 PROCEDURE — 96361 HYDRATE IV INFUSION ADD-ON: CPT

## 2019-10-12 PROCEDURE — 96374 THER/PROPH/DIAG INJ IV PUSH: CPT

## 2019-10-12 NOTE — XR
EXAMINATION TYPE: XR KUB

 

DATE OF EXAM: 10/12/2019 6:02 PM

 

CLINICAL HISTORY:  Left lower quadrant pain, hematuria

 

TECHNIQUE: Single upright KUB image of the abdomen is obtained.

 

COMPARISON: None.

 

FINDINGS: Scattered gas is seen in non-distended small bowel loops. Gas and fecal material is seen in
 non-distended colon. There is no visceromegaly, pneumoperitoneum, or abnormal calcification apprecia
natali. The lung bases are clear and the osseous structures are intact.

 

IMPRESSION: 

 

Overall nonobstructive bowel gas pattern.

## 2019-10-12 NOTE — US
EXAMINATION TYPE: US kidneys/renal and bladder

 

DATE OF EXAM: 10/12/2019

 

COMPARISON: CT abdomen/pelvis 1/6/2015

 

CLINICAL HISTORY: Left flank pain and hematuria.

 

EXAM MEASUREMENTS:

 

Right Kidney:  10.1 x 3.5 x 4.6 cm. 

Left Kidney: 10.5 x 5.1 x 4.9 cm. 

 

There is no evidence for hydronephrosis at this point in time.  No shadowing nephrolithiasis is seen.
  No masses are identified.  The urinary bladder is anechoic.  Bilateral ureteral jets are seen.

 

IMPRESSION:

No hydronephrosis.

## 2019-10-12 NOTE — ED
Female Urogenital HPI





- General


Chief complaint: Urogenital


Stated complaint: Kidney pain, blood in urine


Time Seen by Provider: 10/12/19 17:20


Source: patient


Mode of arrival: ambulatory


Limitations: no limitations





- History of Present Illness


Initial comments: 


Jovana is a previously healthy 24-year-old female who presents to the ER today 

for evaluation of hematuria and left-sided flank pain.  Patient reports that she

woke this morning she had a single episode of hematuria, she didn't have any 

dysuria or urinary frequency with this.  She states she's been drinking plenty 

of water, she states that again at work she had another episode of hematuria and

then noted stabbing pain in her left flank.  Pain was severe sharp last 

approximately 15 minutes and resolved prior to arrival.  Patient no history of 

kidney stones.  She does have a history of recurrent urinary tract infections 

but states that this doesn't seem like that at all.  Patient states her menses 

ended yesterday, she has no concern for pregnancy she's not been sexually 

active.  She denies any vaginal discharge or concern for sexual transmitted 

infection.





Last Menstrual Period: 10/04/19





- Related Data


                                Home Medications











 Medication  Instructions  Recorded  Confirmed


 


Albuterol Sulfate [Ventolin HFA] 1 - 2 puff INHALATION RT-Q6H PRN 10/12/19 

10/12/19


 


Dextroamphetamine/Amphetamine 30 mg PO BID 10/12/19 10/12/19





[Adderall]   


 


Loratadine [Claritin] 10 mg PO DAILY 10/12/19 10/12/19


 


Vienva 0.1/0.2mg 1 tab PO DAILY 10/12/19 10/12/19











                                    Allergies











Allergy/AdvReac Type Severity Reaction Status Date / Time


 


methylphenidate HCl Allergy  Unknown Verified 10/12/19 17:46





[From Metadate CD]     


 


sulfamethoxazole Allergy  Rash/Hives Verified 10/12/19 17:46





[From Bactrim]     


 


trimethoprim [From Bactrim] Allergy  Rash/Hives Verified 10/12/19 17:46


 


cephalexin [From Keflex] AdvReac  Nausea & Verified 10/12/19 17:46





   Vomiting  














Review of Systems


ROS Statement: 


Those systems with pertinent positive or pertinent negative responses have been 

documented in the HPI.





ROS Other: All systems not noted in ROS Statement are negative.





Past Medical History


Past Medical History: No Reported History


Additional Past Medical History / Comment(s): hypoglycemia, elevated liver 

enzymes


History of Any Multi-Drug Resistant Organisms: None Reported


Past Surgical History: Tonsillectomy


Additional Past Surgical History / Comment(s): oral surgery


Past Anesthesia/Blood Transfusion Reactions: No Reported Reaction


Past Psychological History: Anxiety


Smoking Status: Current every day smoker


Past Alcohol Use History: Daily


Past Drug Use History: None Reported





- Past Family History


  ** Mother


Family Medical History: Asthma, Thyroid Disorder





General Exam





- General Exam Comments


Initial Comments: 


Physical Exam


GENERAL:


Patient is well-developed and well-nourished.  Patient is nontoxic and well-

hydrated and is in no distress.





HENT:


Normocephalic, Atraumatic. 





EYES:


PERRL, EOMI





PULMONARY:


Unlabored respirations.





CARDIOVASCULAR:


There is a regular rate and rhythm without any murmurs gallops or rubs.  





ABDOMEN:


Soft and nontender with normal bowel sounds. 


No tenderness to percussion of flank





SKIN:


Skin is clear with no lesions or rashes and otherwise unremarkable.





: 


Deferred





NEUROLOGIC:


Patient is alert and oriented x3.  Moving all extremities spontaneously





MUSCULOSKELETAL:


Normal extremities with adequate strength and full range of motion.  No lower 

extremity swelling or edema.  No calf tenderness.  





PSYCHIATRIC:


Normal psychiatric evaluation.





Limitations: no limitations





Course


                                   Vital Signs











  10/12/19 10/12/19





  17:10 20:13


 


Temperature 97.8 F 98.2 F


 


Pulse Rate 111 H 100


 


Respiratory 16 18





Rate  


 


Blood Pressure 127/76 128/77


 


O2 Sat by Pulse 100 98





Oximetry  














Medical Decision Making





- Medical Decision Making





Patient was seen and evaluated, history obtained from patient 


24-year-old healthy female with episode of left-sided flank pain and hematuria, 

pain is now resolved


Urinalysis with scant hematuria no signs of infection


KUB unremarkable


Renal ultrasound was ordered and obtained however patient was eager for 

discharge home and she needs to get to her child.  Patient's been asymptomatic 

while in the emergency department is comfortable with plan for discharge home 

and outpatient follow-up with primary care.





- Lab Data


Result diagrams: 


                                 10/12/19 17:55





                                 10/12/19 17:55


                                   Lab Results











  10/12/19 10/12/19 10/12/19 Range/Units





  17:23 17:23 17:55 


 


WBC     (3.8-10.6)  k/uL


 


RBC     (3.80-5.40)  m/uL


 


Hgb     (11.4-16.0)  gm/dL


 


Hct     (34.0-46.0)  %


 


MCV     (80.0-100.0)  fL


 


MCH     (25.0-35.0)  pg


 


MCHC     (31.0-37.0)  g/dL


 


RDW     (11.5-15.5)  %


 


Plt Count     (150-450)  k/uL


 


Neutrophils %     %


 


Lymphocytes %     %


 


Monocytes %     %


 


Eosinophils %     %


 


Basophils %     %


 


Neutrophils #     (1.3-7.7)  k/uL


 


Lymphocytes #     (1.0-4.8)  k/uL


 


Monocytes #     (0-1.0)  k/uL


 


Eosinophils #     (0-0.7)  k/uL


 


Basophils #     (0-0.2)  k/uL


 


Sodium    138  (137-145)  mmol/L


 


Potassium    4.3  (3.5-5.1)  mmol/L


 


Chloride    102  ()  mmol/L


 


Carbon Dioxide    28  (22-30)  mmol/L


 


Anion Gap    8  mmol/L


 


BUN    19 H  (7-17)  mg/dL


 


Creatinine    0.56  (0.52-1.04)  mg/dL


 


Est GFR (CKD-EPI)AfAm    >90  (>60 ml/min/1.73 sqM)  


 


Est GFR (CKD-EPI)NonAf    >90  (>60 ml/min/1.73 sqM)  


 


Glucose    87  (74-99)  mg/dL


 


Calcium    9.6  (8.4-10.2)  mg/dL


 


Urine Color   Colorless   


 


Urine Appearance   Clear   (Clear)  


 


Urine pH   6.5   (5.0-8.0)  


 


Ur Specific Gravity   1.001   (1.001-1.035)  


 


Urine Protein   Negative   (Negative)  


 


Urine Glucose (UA)   Negative   (Negative)  


 


Urine Ketones   Negative   (Negative)  


 


Urine Blood   Small H   (Negative)  


 


Urine Nitrite   Negative   (Negative)  


 


Urine Bilirubin   Negative   (Negative)  


 


Urine Urobilinogen   <2.0   (<2.0)  mg/dL


 


Ur Leukocyte Esterase   Negative   (Negative)  


 


Urine RBC   <1   (0-5)  /hpf


 


Ur Squamous Epith Cells   <1   (0-4)  /hpf


 


Urine HCG, Qual  Not Detected    (Not Detectd)  














  10/12/19 Range/Units





  17:55 


 


WBC  7.6  (3.8-10.6)  k/uL


 


RBC  4.86  (3.80-5.40)  m/uL


 


Hgb  13.9  (11.4-16.0)  gm/dL


 


Hct  42.2  (34.0-46.0)  %


 


MCV  86.9  (80.0-100.0)  fL


 


MCH  28.7  (25.0-35.0)  pg


 


MCHC  33.0  (31.0-37.0)  g/dL


 


RDW  13.0  (11.5-15.5)  %


 


Plt Count  283  (150-450)  k/uL


 


Neutrophils %  50  %


 


Lymphocytes %  35  %


 


Monocytes %  8  %


 


Eosinophils %  3  %


 


Basophils %  1  %


 


Neutrophils #  3.8  (1.3-7.7)  k/uL


 


Lymphocytes #  2.6  (1.0-4.8)  k/uL


 


Monocytes #  0.6  (0-1.0)  k/uL


 


Eosinophils #  0.2  (0-0.7)  k/uL


 


Basophils #  0.1  (0-0.2)  k/uL


 


Sodium   (137-145)  mmol/L


 


Potassium   (3.5-5.1)  mmol/L


 


Chloride   ()  mmol/L


 


Carbon Dioxide   (22-30)  mmol/L


 


Anion Gap   mmol/L


 


BUN   (7-17)  mg/dL


 


Creatinine   (0.52-1.04)  mg/dL


 


Est GFR (CKD-EPI)AfAm   (>60 ml/min/1.73 sqM)  


 


Est GFR (CKD-EPI)NonAf   (>60 ml/min/1.73 sqM)  


 


Glucose   (74-99)  mg/dL


 


Calcium   (8.4-10.2)  mg/dL


 


Urine Color   


 


Urine Appearance   (Clear)  


 


Urine pH   (5.0-8.0)  


 


Ur Specific Gravity   (1.001-1.035)  


 


Urine Protein   (Negative)  


 


Urine Glucose (UA)   (Negative)  


 


Urine Ketones   (Negative)  


 


Urine Blood   (Negative)  


 


Urine Nitrite   (Negative)  


 


Urine Bilirubin   (Negative)  


 


Urine Urobilinogen   (<2.0)  mg/dL


 


Ur Leukocyte Esterase   (Negative)  


 


Urine RBC   (0-5)  /hpf


 


Ur Squamous Epith Cells   (0-4)  /hpf


 


Urine HCG, Qual   (Not Detectd)  














Disposition


Clinical Impression: 


 Hematuria, Flank pain





Disposition: HOME SELF-CARE


Condition: Stable


Additional Instructions: 


Follow up with your primary doctor within 2-3 days. 





Use Motrin (also called Ibuprofen or Advil) 400-800 mg every 6 hours as needed 

for pain. Take this with food, if you have any stomach discomfort while taking 

Motrin, you can use TUMS to help. 





Drink plenty of fluids, avoid caffeine & alcohol. 





Please continue taking your home medications as directed. 





Do not use alcohol when taking any medication (especially antibiotics, tylenol 

or other pain medication) unless you check with the doctor or pharmacist.





Any worsening pain, fever, chills, difficulty urinating, or any other concerns, 

please see your doctor immediately or return to Emergency Department right away.


Is patient prescribed a controlled substance at d/c from ED?: No


Referrals: 


Ilia Holt DO [Primary Care Provider] - 1-2 days

## 2020-06-12 ENCOUNTER — HOSPITAL ENCOUNTER (EMERGENCY)
Dept: HOSPITAL 47 - EC | Age: 25
Discharge: HOME | End: 2020-06-12
Payer: COMMERCIAL

## 2020-06-12 VITALS — DIASTOLIC BLOOD PRESSURE: 76 MMHG | SYSTOLIC BLOOD PRESSURE: 120 MMHG | TEMPERATURE: 98 F | RESPIRATION RATE: 18 BRPM

## 2020-06-12 VITALS — HEART RATE: 97 BPM

## 2020-06-12 DIAGNOSIS — Z79.899: ICD-10-CM

## 2020-06-12 DIAGNOSIS — Z88.2: ICD-10-CM

## 2020-06-12 DIAGNOSIS — T49.0X5A: ICD-10-CM

## 2020-06-12 DIAGNOSIS — Z88.8: ICD-10-CM

## 2020-06-12 DIAGNOSIS — L23.3: Primary | ICD-10-CM

## 2020-06-12 DIAGNOSIS — F17.200: ICD-10-CM

## 2020-06-12 DIAGNOSIS — Z88.1: ICD-10-CM

## 2020-06-12 PROCEDURE — 99283 EMERGENCY DEPT VISIT LOW MDM: CPT

## 2020-06-12 NOTE — ED
Skin/Abscess/FB HPI





- General


Chief complaint: Skin/Abscess/Foreign Body


Stated complaint: allergic reaction


Time Seen by Provider: 06/12/20 04:33


Source: patient


Mode of arrival: ambulatory


Limitations: no limitations





- History of Present Illness


Initial comments: 


Jovana is a 25-year-old female presents ER today for evaluation of a reaction 

to medication.  Patient reports she noticed a rash in her legs earlier this week

she does admit that she had helped a friend rescue baby raccoons and was 

concerned she may have ringworm she saw her primary care physician who 

prescribed topical ketoconazole.  Patient reports that she used it earlier this 

morning with no side effects however when she applied it this evening she had 

immediate burning of her skin including on her legs arms and face were she 

applied it.  Patient then took a shower and tried to wash the medication off she

continued have burning into the Benadryl.  She then decided she should come to 

the ER this morning before her son wakes up so she didn't have to bring the 

child into the ER.  Patient denies any associated fevers chills nausea vomiting 

or recent illness.  She reports a rash on her legs and arms is not purulent 

pruritic but she seems to be developing more lesions daily.








- Related Data


                                Home Medications











 Medication  Instructions  Recorded  Confirmed


 


Albuterol Sulfate [Ventolin HFA] 1 - 2 puff INHALATION RT-Q6H PRN 10/12/19 

10/12/19


 


Dextroamphetamine/Amphetamine 30 mg PO BID 10/12/19 10/12/19





[Adderall]   


 


Loratadine [Claritin] 10 mg PO DAILY 10/12/19 10/12/19


 


Vienva 0.1/0.2mg 1 tab PO DAILY 10/12/19 10/12/19











                                    Allergies











Allergy/AdvReac Type Severity Reaction Status Date / Time


 


methylphenidate HCl Allergy  Unknown Verified 06/12/20 04:25





[From Metadate CD]     


 


sulfamethoxazole Allergy  Rash/Hives Verified 06/12/20 04:25





[From Bactrim]     


 


trimethoprim [From Bactrim] Allergy  Rash/Hives Verified 06/12/20 04:25


 


cephalexin [From Keflex] AdvReac  Nausea & Verified 06/12/20 04:25





   Vomiting  














Review of Systems


ROS Statement: 


Those systems with pertinent positive or pertinent negative responses have been 

documented in the HPI.





ROS Other: All systems not noted in ROS Statement are negative.





Past Medical History


Past Medical History: No Reported History


Additional Past Medical History / Comment(s): hypoglycemia, elevated liver 

enzymes


History of Any Multi-Drug Resistant Organisms: None Reported


Past Surgical History: Tonsillectomy


Additional Past Surgical History / Comment(s): oral surgery


Past Anesthesia/Blood Transfusion Reactions: No Reported Reaction


Past Psychological History: Anxiety


Smoking Status: Current some day smoker


Past Alcohol Use History: Occasional


Past Drug Use History: None Reported





- Past Family History


  ** Mother


Family Medical History: Asthma, Thyroid Disorder





General Exam





- General Exam Comments


Initial Comments: 


Physical Exam


GENERAL:


Patient is well-developed and well-nourished.  


Patient is nontoxic and well-hydrated and is in no distress.





HENT:


Normocephalic, Atraumatic. 





EYES:


PERRL, EOMI





PULMONARY:


Unlabored respirations.  





CARDIOVASCULAR:


RRR


Warm and well perfused extremities





ABDOMEN:


Non-distended





SKIN:


Rash on legs and arms, circular lesions measuring 3 mm to approximately 1 cm, 2 

lesions on the left knee do appear to be ringworm-like in appearance as they are

circular and more prominent around the edges of the rash.  Other lesions seemed 

to be just developing and are much smaller.





: 


Deferred





NEUROLOGIC:


Alert and oriented


Normal speech


Normal gait





MUSCULOSKELETAL:


Moving all extremities with no apparent injury 





PSYCHIATRIC:


No SI/HI





Limitations: no limitations





Course


                                   Vital Signs











  06/12/20





  04:18


 


Temperature 98 F


 


Pulse Rate 124 H


 


Respiratory 18





Rate 


 


Blood Pressure 120/76


 


O2 Sat by Pulse 100





Oximetry 














Medical Decision Making





- Medical Decision Making


Patient was seen and evaluated history is obtained from the patient, history and

physical exam concerning for likely ringworm type rash on the legs and arms, 

patient reported a burning reaction to the medication that she doesn't have any 

redness erythema or signs of ALLERGIC reaction.  Patient was advised to stop the

ketoconazole, she can try selenium sulfide topical which can buy 

over-the-counter.  Patient was referred to dermatology for outpatient follow-up.





Disposition


Clinical Impression: 


 Adverse reaction to antifungal drug, Skin eruption





Disposition: HOME SELF-CARE


Condition: Stable


Instructions (If sedation given, give patient instructions):  Skin Yeast 

Infection (ED)


Additional Instructions: 


Buy a Selenium Sulfide lotion such as Selsus over the counter





Is patient prescribed a controlled substance at d/c from ED?: No


Referrals: 


Ilia Holt DO [Primary Care Provider] - 1-2 days


[facility] [Provider Group] - 1-2 days

## 2020-08-18 ENCOUNTER — HOSPITAL ENCOUNTER (EMERGENCY)
Dept: HOSPITAL 47 - EC | Age: 25
Discharge: HOME | End: 2020-08-18
Payer: COMMERCIAL

## 2020-08-18 VITALS
RESPIRATION RATE: 18 BRPM | DIASTOLIC BLOOD PRESSURE: 61 MMHG | HEART RATE: 85 BPM | SYSTOLIC BLOOD PRESSURE: 118 MMHG | TEMPERATURE: 98.7 F

## 2020-08-18 DIAGNOSIS — Z88.1: ICD-10-CM

## 2020-08-18 DIAGNOSIS — L03.90: ICD-10-CM

## 2020-08-18 DIAGNOSIS — Z79.899: ICD-10-CM

## 2020-08-18 DIAGNOSIS — Z88.8: ICD-10-CM

## 2020-08-18 DIAGNOSIS — L25.9: Primary | ICD-10-CM

## 2020-08-18 DIAGNOSIS — F17.200: ICD-10-CM

## 2020-08-18 DIAGNOSIS — Z88.2: ICD-10-CM

## 2020-08-18 PROCEDURE — 99282 EMERGENCY DEPT VISIT SF MDM: CPT

## 2020-08-18 NOTE — ED
Skin/Abscess/FB HPI





- General


Chief complaint: Skin/Abscess/Foreign Body


Stated complaint: Bug Bite/Allergic Reaction


Time Seen by Provider: 08/18/20 14:48


Source: patient, RN notes reviewed


Mode of arrival: ambulatory


Limitations: no limitations





- History of Present Illness


Initial comments: 





25-year-old female presents emergency Department chief complaint of a rash to 

her left knee posterior aspect.  Patient states that started approximately one 

week ago.  She was seen at [facility] was given some sort of antibacterial 

cream.  She states is increasing.  She states that small blisters are popping.  

No fevers chills no pain proximal or distal.  She is unsure what caused the 

initial wound.





- Related Data


                                Home Medications











 Medication  Instructions  Recorded  Confirmed


 


Albuterol Sulfate [Ventolin HFA] 1 - 2 puff INHALATION RT-Q6H PRN 10/12/19 

10/12/19


 


Dextroamphetamine/Amphetamine 30 mg PO BID 10/12/19 10/12/19





[Adderall]   


 


Loratadine [Claritin] 10 mg PO DAILY 10/12/19 10/12/19


 


Vienva 0.1/0.2mg 1 tab PO DAILY 10/12/19 10/12/19








                                  Previous Rx's











 Medication  Instructions  Recorded


 


Clindamycin HCl 300 mg PO Q6HR #40 cap 08/18/20


 


Triamcinolone 0.1% Cream [Kenalog 1 applicatio TOPICAL BID #15 gram 08/18/20





0.1% Cream]  











                                    Allergies











Allergy/AdvReac Type Severity Reaction Status Date / Time


 


methylphenidate HCl Allergy  Unknown Verified 08/18/20 14:46





[From Metadate CD]     


 


sulfamethoxazole Allergy  Rash/Hives Verified 08/18/20 14:46





[From Bactrim]     


 


trimethoprim [From Bactrim] Allergy  Rash/Hives Verified 08/18/20 14:46


 


cephalexin [From Keflex] AdvReac  Nausea & Verified 08/18/20 14:46





   Vomiting  














Review of Systems


ROS Statement: 


Those systems with pertinent positive or pertinent negative responses have been 

documented in the HPI.





ROS Other: All systems not noted in ROS Statement are negative.





Past Medical History


Past Medical History: No Reported History


Additional Past Medical History / Comment(s): hypoglycemia, elevated liver 

enzymes


History of Any Multi-Drug Resistant Organisms: None Reported


Past Surgical History: Tonsillectomy


Additional Past Surgical History / Comment(s): oral surgery


Past Anesthesia/Blood Transfusion Reactions: No Reported Reaction


Past Psychological History: Anxiety, Bipolar


Smoking Status: Current every day smoker


Past Alcohol Use History: Occasional


Past Drug Use History: None Reported





- Past Family History


  ** Mother


Family Medical History: Asthma, Thyroid Disorder





General Exam


Limitations: no limitations


General appearance: alert, in no apparent distress


Head exam: Present: atraumatic, normocephalic, normal inspection


Respiratory exam: Present: normal lung sounds bilaterally.  Absent: respiratory 

distress, wheezes, rales, rhonchi, stridor


Cardiovascular Exam: Present: regular rate, normal rhythm, normal heart sounds. 

Absent: systolic murmur, diastolic murmur, rubs, gallop, clicks


Extremities exam: Present: other (Left posterior knee there is erythematous rash

with some macular papular areas, excoriations noted.)





Course


                                   Vital Signs











  08/18/20





  14:43


 


Temperature 98.7 F


 


Pulse Rate 85


 


Respiratory 18





Rate 


 


Blood Pressure 118/61


 


O2 Sat by Pulse 97





Oximetry 














Medical Decision Making





- Medical Decision Making





Patient appears to have some sort of contact dermatitis versus cellulitis.  

Patient we placed on clindamycin as she has ALLERGY to Bactrim and Keflex.  

Patient was also given Kenalog cream.





Disposition


Clinical Impression: 


 Contact dermatitis, Cellulitis





Disposition: HOME SELF-CARE


Condition: Stable


Instructions (If sedation given, give patient instructions):  Cellulitis (ED)


Additional Instructions: 


Please return to the Emergency Department if symptoms worsen or any other 

concerns.


Prescriptions: 


Clindamycin HCl 300 mg PO Q6HR #40 cap


Triamcinolone 0.1% Cream [Kenalog 0.1% Cream] 1 applicatio TOPICAL BID #15 gram


Is patient prescribed a controlled substance at d/c from ED?: No


Referrals: 


Ilia Holt DO [Primary Care Provider] - 1-2 days


Time of Disposition: 14:53

## 2020-11-08 ENCOUNTER — HOSPITAL ENCOUNTER (EMERGENCY)
Dept: HOSPITAL 47 - EC | Age: 25
Discharge: HOME | End: 2020-11-08
Payer: COMMERCIAL

## 2020-11-08 VITALS — DIASTOLIC BLOOD PRESSURE: 84 MMHG | TEMPERATURE: 98.8 F | SYSTOLIC BLOOD PRESSURE: 123 MMHG

## 2020-11-08 VITALS — HEART RATE: 98 BPM

## 2020-11-08 VITALS — RESPIRATION RATE: 18 BRPM

## 2020-11-08 DIAGNOSIS — K12.0: Primary | ICD-10-CM

## 2020-11-08 DIAGNOSIS — F17.200: ICD-10-CM

## 2020-11-08 DIAGNOSIS — Z88.8: ICD-10-CM

## 2020-11-08 DIAGNOSIS — Z88.1: ICD-10-CM

## 2020-11-08 DIAGNOSIS — Z79.3: ICD-10-CM

## 2020-11-08 DIAGNOSIS — Z88.2: ICD-10-CM

## 2020-11-08 DIAGNOSIS — Z79.899: ICD-10-CM

## 2020-11-08 PROCEDURE — 99282 EMERGENCY DEPT VISIT SF MDM: CPT

## 2020-11-08 NOTE — ED
General Adult HPI





- General


Chief complaint: Dental/Oral


Stated complaint: Dental Pain


Time Seen by Provider: 11/08/20 20:56


Source: patient, RN notes reviewed


Mode of arrival: ambulatory


Limitations: no limitations





- History of Present Illness


Initial comments: 





25-year-old female presents to the emergency room for a chief complaint of mouth

pain.  Patient reports that she has had sores in her mouth for the past couple 

days.  States that she has pain on the left inner lower gumline.  Patient has 

not had any fevers.  Patient denies any dental pain.  Patient denies difficulty 

swallowing.Patient has no other complaints at this time including shortness of 

breath, chest pain, abdominal pain, nausea or vomiting, headache, or visual 

changes.





- Related Data


                                Home Medications











 Medication  Instructions  Recorded  Confirmed


 


Albuterol Sulfate [Ventolin HFA] 1 - 2 puff INHALATION RT-Q6H PRN 10/12/19 

10/12/19


 


Dextroamphetamine/Amphetamine 30 mg PO BID 10/12/19 10/12/19





[Adderall]   


 


Loratadine [Claritin] 10 mg PO DAILY 10/12/19 10/12/19


 


Vienva 0.1/0.2mg 1 tab PO DAILY 10/12/19 10/12/19








                                  Previous Rx's











 Medication  Instructions  Recorded


 


Clindamycin HCl 300 mg PO Q6HR #40 cap 08/18/20


 


Triamcinolone 0.1% Cream [Kenalog 1 applicatio TOPICAL BID #15 gram 08/18/20





0.1% Cream]  


 


Mupirocin 2% Oint [Bactroban 2% 1 applic TOPICAL TID 5 Days #15 gm 11/08/20





Oint]  


 


lidocaine HCL [Lidocaine HCl 5 ml MM Q6H PRN #50 ml 11/08/20





Viscous]  


 


valACYclovir [Valtrex] 1,000 mg PO BID 1 Days #4 tab 11/08/20











                                    Allergies











Allergy/AdvReac Type Severity Reaction Status Date / Time


 


methylphenidate HCl Allergy  Unknown Verified 11/08/20 20:42





[From Metadate CD]     


 


sulfamethoxazole Allergy  Rash/Hives Verified 11/08/20 20:42





[From Bactrim]     


 


trimethoprim [From Bactrim] Allergy  Rash/Hives Verified 11/08/20 20:42


 


cephalexin [From Keflex] AdvReac  Nausea & Verified 11/08/20 20:42





   Vomiting  














Review of Systems


ROS Statement: 


Those systems with pertinent positive or pertinent negative responses have been 

documented in the HPI.





ROS Other: All systems not noted in ROS Statement are negative.





Past Medical History


Past Medical History: No Reported History


Additional Past Medical History / Comment(s): hypoglycemia, elevated liver 

enzymes


History of Any Multi-Drug Resistant Organisms: None Reported


Past Surgical History: Tonsillectomy


Additional Past Surgical History / Comment(s): oral surgery


Past Anesthesia/Blood Transfusion Reactions: No Reported Reaction


Past Psychological History: Anxiety, Bipolar, Depression


Smoking Status: Current every day smoker


Past Alcohol Use History: Occasional


Past Drug Use History: None Reported





- Past Family History


  ** Mother


Family Medical History: Asthma, Thyroid Disorder





General Exam


Limitations: no limitations


General appearance: alert, in no apparent distress


Head exam: Present: atraumatic, normocephalic, normal inspection


Eye exam: Present: normal appearance, PERRL, EOMI.  Absent: scleral icterus, 

conjunctival injection, periorbital swelling


ENT exam: Present: normal exam, mucous membranes moist.  Absent: normal 

oropharynx (aphthous ulcer right corner lower lip and mid upper lip.  Aphthous 

ulcer noted to the left lower inner cheek.)


Neck exam: Present: normal inspection, full ROM.  Absent: tenderness, 

meningismus, lymphadenopathy


Respiratory exam: Present: normal lung sounds bilaterally.  Absent: respiratory 

distress, wheezes, rales, rhonchi, stridor


Cardiovascular Exam: Present: regular rate, normal rhythm, normal heart sounds. 

Absent: systolic murmur, diastolic murmur, rubs, gallop, clicks


GI/Abdominal exam: Present: soft, normal bowel sounds.  Absent: distended, 

tenderness, guarding, rebound, rigid


Neurological exam: Present: alert





Course





                                   Vital Signs











  11/08/20





  20:33


 


Temperature 97.7 F


 


Pulse Rate 115 H


 


Respiratory 18





Rate 


 


Blood Pressure 135/85


 


O2 Sat by Pulse 100





Oximetry 














Medical Decision Making





- Medical Decision Making





Patient has  ulcers noted on the lip margins as well as inside the mouth.  She 

has never had these sores before.  Patient will be treated with valacyclovir and

Magic mouthwash.  She will be given mupirocin ointment as well.  She will follow

up with her doctor.  She will return here for any worsening symptoms.  





Disposition


Clinical Impression: 


 Lip ulcer





Disposition: HOME SELF-CARE


Condition: Good


Instructions (If sedation given, give patient instructions):  Oral Herpes 

Simplex Virus Infections (ED)


Additional Instructions: 


Take medications as directed.  Follow up with your doctor in one to 2 days.  

Return to the emergency room for any worsening symptoms.





Mix 5 mL's of viscous lidocaine with 5 mL of liquid Benadryl and 5 mL's of 

Maalox.  Swish and spit.  You may do this every 4-6 hours as needed for pain.  

Do not swallow.


Prescriptions: 


Mupirocin 2% Oint [Bactroban 2% Oint] 1 applic TOPICAL TID 5 Days #15 gm


lidocaine HCL [Lidocaine HCl Viscous] 5 ml MM Q6H PRN #50 ml


 PRN Reason: Pain


valACYclovir [Valtrex] 1,000 mg PO BID 1 Days #4 tab


Is patient prescribed a controlled substance at d/c from ED?: No


Referrals: 


Ilia Holt DO [Primary Care Provider] - 1-2 days


Time of Disposition: 21:44

## 2020-12-19 ENCOUNTER — HOSPITAL ENCOUNTER (EMERGENCY)
Dept: HOSPITAL 47 - EC | Age: 25
Discharge: HOME | End: 2020-12-19
Payer: COMMERCIAL

## 2020-12-19 VITALS
HEART RATE: 101 BPM | DIASTOLIC BLOOD PRESSURE: 83 MMHG | SYSTOLIC BLOOD PRESSURE: 137 MMHG | TEMPERATURE: 98.1 F | RESPIRATION RATE: 18 BRPM

## 2020-12-19 DIAGNOSIS — Z88.2: ICD-10-CM
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
## 2020-12-19 DIAGNOSIS — Z88.1: ICD-10-CM

## 2020-12-19 DIAGNOSIS — F17.210: ICD-10-CM

## 2020-12-19 DIAGNOSIS — Z88.8: ICD-10-CM

## 2020-12-19 DIAGNOSIS — Z79.899: ICD-10-CM

## 2020-12-19 DIAGNOSIS — S93.402A: Primary | ICD-10-CM

## 2020-12-19 PROCEDURE — 29515 APPLICATION SHORT LEG SPLINT: CPT

## 2020-12-19 PROCEDURE — 73610 X-RAY EXAM OF ANKLE: CPT

## 2020-12-19 PROCEDURE — 73630 X-RAY EXAM OF FOOT: CPT

## 2020-12-19 PROCEDURE — 99283 EMERGENCY DEPT VISIT LOW MDM: CPT

## 2020-12-19 NOTE — ED
Lower Extremity Injury HPI





- General


Chief Complaint: Extremity Injury, Lower


Stated Complaint: L Ankle/Foot Injury


Time Seen by Provider: 12/19/20 14:23


Source: patient


Mode of arrival: wheelchair


Limitations: no limitations





- History of Present Illness


Initial Comments: 


25-year-old female patient presents to the emergency department today for 

evaluation of left ankle pain.  Patient states that last night she went out to 

smoke a cigarette when she tripped and fell.  She states that her ankle twisted.

 States that throughout the day the swelling has been worsening.  States she did

take Motrin that didn't help for the pain.  States she is having difficulty 

ambulating.  States the pain shoots up her leg.  She denies hitting her head or 

losing consciousness with the fall.  Denies any other injuries.  Denies chance 

of pregnancy. Patient denies any headache, neck pain, back pain, chest pain, 

shortness of breath, dizziness, weakness, abdominal pain, nausea, vomiting, or 

difficulties with bowel movements or urination.








- Related Data


                                Home Medications











 Medication  Instructions  Recorded  Confirmed


 


Albuterol Sulfate [Ventolin HFA] 1 - 2 puff INHALATION RT-Q6H PRN 10/12/19 

10/12/19


 


Dextroamphetamine/Amphetamine 30 mg PO BID 10/12/19 10/12/19





[Adderall]   


 


Loratadine [Claritin] 10 mg PO DAILY 10/12/19 10/12/19


 


Vienva 0.1/0.2mg 1 tab PO DAILY 10/12/19 10/12/19








                                  Previous Rx's











 Medication  Instructions  Recorded


 


Clindamycin HCl 300 mg PO Q6HR #40 cap 08/18/20


 


Triamcinolone 0.1% Cream [Kenalog 1 applicatio TOPICAL BID #15 gram 08/18/20





0.1% Cream]  


 


Mupirocin 2% Oint [Bactroban 2% 1 applic TOPICAL TID 5 Days #15 gm 11/08/20





Oint]  


 


lidocaine HCL [Lidocaine HCl 5 ml MM Q6H PRN #50 ml 11/08/20





Viscous]  


 


valACYclovir [Valtrex] 1,000 mg PO BID 1 Days #4 tab 11/08/20











                                    Allergies











Allergy/AdvReac Type Severity Reaction Status Date / Time


 


methylphenidate HCl Allergy  Unknown Verified 12/19/20 14:21





[From Metadate CD]     


 


sulfamethoxazole Allergy  Rash/Hives Verified 12/19/20 14:21





[From Bactrim]     


 


trimethoprim [From Bactrim] Allergy  Rash/Hives Verified 12/19/20 14:21


 


cephalexin [From Keflex] AdvReac  Nausea & Verified 12/19/20 14:21





   Vomiting  














Review of Systems


ROS Statement: 


Those systems with pertinent positive or pertinent negative responses have been 

documented in the HPI.





ROS Other: All systems not noted in ROS Statement are negative.





Past Medical History


Past Medical History: No Reported History


Additional Past Medical History / Comment(s): hypoglycemia, elevated liver 

enzymes


History of Any Multi-Drug Resistant Organisms: None Reported


Past Surgical History: Tonsillectomy


Additional Past Surgical History / Comment(s): oral surgery


Past Anesthesia/Blood Transfusion Reactions: No Reported Reaction


Past Psychological History: Anxiety, Bipolar, Depression


Smoking Status: Current every day smoker


Past Alcohol Use History: Occasional


Past Drug Use History: Marijuana





- Past Family History


  ** Mother


Family Medical History: Asthma, Thyroid Disorder





General Exam


Limitations: no limitations


General appearance: alert, in no apparent distress, other (Physical well-

developed, well-nourished adult female patient in no acute distress.  Vital sig

ns upon presentation are temperature 98.1F, pulse 101, respirations 18, blood 

pressure 137/83, pulse ox 100% on room air.)


Head exam: Present: atraumatic, normocephalic, normal inspection


Neck exam: Present: normal inspection, full ROM, other (Nontender, no step-off, 

no deformity to firm midline palpation of the posterior cervical spine.  Full 

range of motion without pain or limitation.).  Absent: tenderness, meningismus, 

lymphadenopathy


Respiratory exam: Present: normal lung sounds bilaterally.  Absent: respiratory 

distress, wheezes, rales, rhonchi, stridor


Cardiovascular Exam: Present: regular rate, normal rhythm, normal heart sounds. 

Absent: systolic murmur, diastolic murmur, rubs, gallop, clicks


Extremities exam: Present: full ROM, normal capillary refill, other (Left 

lateral ankle swelling noted.  There is tenderness over the dorsum of the foot 

and over the left lateral malleolus.  Skin is otherwise pink, warm, dry.  Cap 

refills less than 3 seconds.  Pedal pulses 2+ and equal bilaterally.).  Absent: 

normal inspection, tenderness, pedal edema, joint swelling, calf tenderness


Neurological exam: Present: alert, oriented X3, CN II-XII intact


Psychiatric exam: Present: normal affect, normal mood


Skin exam: Present: warm, dry, intact, normal color.  Absent: rash





Course


                                   Vital Signs











  12/19/20





  14:18


 


Temperature 98.1 F


 


Pulse Rate 101 H


 


Respiratory 18





Rate 


 


Blood Pressure 137/83


 


O2 Sat by Pulse 100





Oximetry 














Medical Decision Making





- Medical Decision Making


25-year-old female patient presents to the emergency department today for 

evaluation of left ankle pain and swelling after a fall last night.  Physical 

examination did reveal swelling to the left lateral malleolus.  Patient is able 

to ablate though it is painful.  X-ray was negative for signs of fracture in the

foot or the ankle.  We did apply ankle stirrup splint.  She'll be discharged 

with instructions to rest, ice, elevate.  She is instructed to follow up with 

her primary care physician for recheck in 1-2 days.  She is instructed to have 

repeat x-rays performed in 7-10 days if pain symptoms persist.  Return 

parameters were discussed in detail.  She verbalizes understanding and agrees 

with this plan.








- Radiology Data


Radiology results: report reviewed, image reviewed


3 views of the left ankle are obtained.  Report reviewed in its entirety.  

Impression by Dr. Kahn shows lateral soft tissue swelling.  No fracture 

seen.





3 views of the left foot are obtained.  Report is reviewed in its entirety.  

Impression by Dr. Kahn shows negative left foot exam.  No fracture seen.











Disposition


Clinical Impression: 


 Left ankle sprain





Disposition: HOME SELF-CARE


Condition: Good


Instructions (If sedation given, give patient instructions):  Ankle Sprain (ED)


Additional Instructions: 


Rest, ice, elevate the ankle.  Take Tylenol Motrin for pain control.  Follow-up 

with the primary care physician for recheck in 1-2 days.  Have repeat x-rays 

performed in 7-10 days if pain symptoms persist.  Return to the emergency 

department for any new, worsening, or concerning symptoms.


Is patient prescribed a controlled substance at d/c from ED?: No


Referrals: 


Ilia Holt DO [Primary Care Provider] - 1-2 days


Time of Disposition: 15:17

## 2021-04-07 ENCOUNTER — HOSPITAL ENCOUNTER (EMERGENCY)
Dept: HOSPITAL 47 - EC | Age: 26
LOS: 1 days | Discharge: HOME | End: 2021-04-08
Payer: COMMERCIAL

## 2021-04-07 VITALS — RESPIRATION RATE: 18 BRPM

## 2021-04-07 DIAGNOSIS — R74.01: ICD-10-CM

## 2021-04-07 DIAGNOSIS — Z88.1: ICD-10-CM

## 2021-04-07 DIAGNOSIS — Z79.899: ICD-10-CM

## 2021-04-07 DIAGNOSIS — O03.9: Primary | ICD-10-CM

## 2021-04-07 DIAGNOSIS — Z88.2: ICD-10-CM

## 2021-04-07 DIAGNOSIS — R74.8: ICD-10-CM

## 2021-04-07 DIAGNOSIS — F17.200: ICD-10-CM

## 2021-04-07 DIAGNOSIS — Z88.8: ICD-10-CM

## 2021-04-07 DIAGNOSIS — Z79.3: ICD-10-CM

## 2021-04-07 LAB
ALBUMIN SERPL-MCNC: 4.5 G/DL (ref 3.5–5)
ALP SERPL-CCNC: 82 U/L (ref 38–126)
ALT SERPL-CCNC: 39 U/L (ref 4–34)
ANION GAP SERPL CALC-SCNC: 9 MMOL/L
AST SERPL-CCNC: 52 U/L (ref 14–36)
BASOPHILS # BLD AUTO: 0.1 K/UL (ref 0–0.2)
BASOPHILS NFR BLD AUTO: 1 %
BUN SERPL-SCNC: 11 MG/DL (ref 7–17)
CALCIUM SPEC-MCNC: 9.6 MG/DL (ref 8.4–10.2)
CHLORIDE SERPL-SCNC: 101 MMOL/L (ref 98–107)
CO2 SERPL-SCNC: 25 MMOL/L (ref 22–30)
EOSINOPHIL # BLD AUTO: 0.3 K/UL (ref 0–0.7)
EOSINOPHIL NFR BLD AUTO: 2 %
ERYTHROCYTE [DISTWIDTH] IN BLOOD BY AUTOMATED COUNT: 4.29 M/UL (ref 3.8–5.4)
ERYTHROCYTE [DISTWIDTH] IN BLOOD: 12.7 % (ref 11.5–15.5)
GLUCOSE SERPL-MCNC: 110 MG/DL (ref 74–99)
HCG SERPL-MCNC: 1215.3 MIU/ML
HCT VFR BLD AUTO: 38.2 % (ref 34–46)
HGB BLD-MCNC: 13.2 GM/DL (ref 11.4–16)
LYMPHOCYTES # SPEC AUTO: 2.6 K/UL (ref 1–4.8)
LYMPHOCYTES NFR SPEC AUTO: 19 %
MCH RBC QN AUTO: 30.7 PG (ref 25–35)
MCHC RBC AUTO-ENTMCNC: 34.5 G/DL (ref 31–37)
MCV RBC AUTO: 89.1 FL (ref 80–100)
MONOCYTES # BLD AUTO: 0.5 K/UL (ref 0–1)
MONOCYTES NFR BLD AUTO: 4 %
NEUTROPHILS # BLD AUTO: 10.1 K/UL (ref 1.3–7.7)
NEUTROPHILS NFR BLD AUTO: 74 %
PLATELET # BLD AUTO: 278 K/UL (ref 150–450)
POTASSIUM SERPL-SCNC: 3.8 MMOL/L (ref 3.5–5.1)
PROT SERPL-MCNC: 7.4 G/DL (ref 6.3–8.2)
SODIUM SERPL-SCNC: 135 MMOL/L (ref 137–145)
WBC # BLD AUTO: 13.7 K/UL (ref 3.8–10.6)

## 2021-04-07 PROCEDURE — 86900 BLOOD TYPING SEROLOGIC ABO: CPT

## 2021-04-07 PROCEDURE — 80053 COMPREHEN METABOLIC PANEL: CPT

## 2021-04-07 PROCEDURE — 86901 BLOOD TYPING SEROLOGIC RH(D): CPT

## 2021-04-07 PROCEDURE — 36415 COLL VENOUS BLD VENIPUNCTURE: CPT

## 2021-04-07 PROCEDURE — 84702 CHORIONIC GONADOTROPIN TEST: CPT

## 2021-04-07 PROCEDURE — 96361 HYDRATE IV INFUSION ADD-ON: CPT

## 2021-04-07 PROCEDURE — 76801 OB US < 14 WKS SINGLE FETUS: CPT

## 2021-04-07 PROCEDURE — 85025 COMPLETE CBC W/AUTO DIFF WBC: CPT

## 2021-04-07 PROCEDURE — 96360 HYDRATION IV INFUSION INIT: CPT

## 2021-04-07 PROCEDURE — 99284 EMERGENCY DEPT VISIT MOD MDM: CPT

## 2021-04-07 NOTE — US
EXAMINATION TYPE: Transabdominal

 

DATE OF EXAM: 2021 10:38 PM

 

COMPARISON: NONE

 

CLINICAL HISTORY: Passing large clots; 10wks. Passing large clots. Hx miscarriage. .

 

EXAM PERFORMED:  Transabdominal (TA). Transvaginal not to be performed per Soni CATALAN.

 

EXAM MEASUREMENTS:

 

GESTATIONAL AGE / DATING

 

Physician Established: Not yet established. 

Dates by LMP:  Unknown. 

Dates by First Scan:  This is first scan. 

Dates by Current Scan for: No definite IUP seen. Possible irregular-appearing gestational sac measure
s (5 weeks/2 days)  

** EDC: 2021

 

MATERNAL ANATOMY 

 

Uterus: 10.4 x 5.4 x 5.3 cm. Anteverted.

Right Ovary: 3.3 x 1.9 x 2.0 cm. Follicles seen.

Left Ovary: Not seen.

Post CDS / Adnexa: Appear wnl.

Presence of free fluid: Not seen.

Presence of corpus luteal cyst: No.

 

 

GESTATION / FETAL SURVEY

 

MSD: Complex area seen mid uterus: 1.6 x 0.8 x 1.0 cm.   1.12 cm.(5 weeks/2 days)

 

*Complex area seen lower cervix-vaginal canal measuring: 7.2 x 6.6 x 4.1 cm.

IUP:  No fetal pole seen at this time.

 

Date of LMP: Unknown.

Beta HcG (if available):  Not available

 

 

 

 

 

IMPRESSION:

7 x 4 cm echogenic area in the vaginal vault is probably some blood clot. No evidence of intrauterine
 gestational sac. No adnexal mass. Findings are suggestive of incomplete .

## 2021-04-08 VITALS — SYSTOLIC BLOOD PRESSURE: 108 MMHG | HEART RATE: 99 BPM | TEMPERATURE: 98.1 F | DIASTOLIC BLOOD PRESSURE: 65 MMHG

## 2021-04-08 NOTE — ED
General Adult HPI





- General


Chief complaint: Vaginal Bleeding


Stated complaint: Bleeding,possible mscarriage 10 week


Time Seen by Provider: 21 21:49


Source: patient


Mode of arrival: ambulatory


Limitations: no limitations





- History of Present Illness


Initial comments: 


25-year-old female patient presents to the emergency department today for 

evaluation of vaginal bleeding and early pregnancy. She is A1. Patient blew 

she is around 9010 weeks pregnant.  States she's been having vaginal bleeding 

for the last 4 days.  States today the pain intensified and her bleeding became 

heavier so she presented here for further evaluation.  While waiting in the 

waiting room she did go to the bathroom had increased pressure and did pass what

appeared to be fetal contents. Patient states since then she has had improvement

in pain and bleeding. States that she did have some dizziness and weakness after

this occurred.  She denies any hematuria, dysuria, urinary frequency, urinary 

urgency.  Denies fever or chills. Patient denies any recent rash, cough, 

shortness of breath, chest pain, nausea, vomiting, diarrhea, constipation, 

numbness, tingling, dizziness, weakness, headache, visual changes, or any other 

complaints.








- Related Data


                                Home Medications











 Medication  Instructions  Recorded  Confirmed


 


Albuterol Sulfate [Ventolin HFA] 1 - 2 puff INHALATION RT-Q6H PRN 10/12/19 

10/12/19


 


Dextroamphetamine/Amphetamine 30 mg PO BID 10/12/19 10/12/19





[Adderall]   


 


Loratadine [Claritin] 10 mg PO DAILY 10/12/19 10/12/19


 


Vienva 0.1/0.2mg 1 tab PO DAILY 10/12/19 10/12/19








                                  Previous Rx's











 Medication  Instructions  Recorded


 


Clindamycin HCl 300 mg PO Q6HR #40 cap 20


 


Triamcinolone 0.1% Cream [Kenalog 1 applicatio TOPICAL BID #15 gram 20





0.1% Cream]  


 


Mupirocin 2% Oint [Bactroban 2% 1 applic TOPICAL TID 5 Days #15 gm 20





Oint]  


 


lidocaine HCL [Lidocaine HCl 5 ml MM Q6H PRN #50 ml 20





Viscous]  


 


valACYclovir [Valtrex] 1,000 mg PO BID 1 Days #4 tab 20











                                    Allergies











Allergy/AdvReac Type Severity Reaction Status Date / Time


 


methylphenidate HCl Allergy  Unknown Verified 21 20:37





[From Metadate CD]     


 


sulfamethoxazole Allergy  Rash/Hives Verified 21 20:37





[From Bactrim]     


 


trimethoprim [From Bactrim] Allergy  Rash/Hives Verified 21 20:37


 


cephalexin [From Keflex] AdvReac  Nausea & Verified 21 20:37





   Vomiting  














Review of Systems


ROS Statement: 


Those systems with pertinent positive or pertinent negative responses have been 

documented in the HPI.





ROS Other: All systems not noted in ROS Statement are negative.





Past Medical History


Past Medical History: No Reported History


Additional Past Medical History / Comment(s): hypoglycemia, elevated liver 

enzymes


History of Any Multi-Drug Resistant Organisms: None Reported


Past Surgical History: Tonsillectomy


Additional Past Surgical History / Comment(s): oral surgery


Past Anesthesia/Blood Transfusion Reactions: No Reported Reaction


Past Psychological History: Anxiety, Bipolar, Depression


Smoking Status: Current every day smoker


Past Alcohol Use History: Occasional


Past Drug Use History: Marijuana





- Past Family History


  ** Mother


Family Medical History: Asthma, Thyroid Disorder





General Exam


Limitations: no limitations


General appearance: alert, in no apparent distress, other (Social well-

developed, well-nourished adult female patient in no acute distress.  Vital 

signs upon presentation are temperature 97.9F, pulse 120, respirations 19, 

blood pressure 114/79, pulse ox 98% on room air.)


Eye exam: Present: normal appearance, PERRL, EOMI.  Absent: scleral icterus, 

conjunctival injection, periorbital swelling


ENT exam: Present: normal exam, normal oropharynx, mucous membranes moist


Respiratory exam: Present: normal lung sounds bilaterally.  Absent: respiratory 

distress, wheezes, rales, rhonchi, stridor


Cardiovascular Exam: Present: regular rate, normal rhythm, normal heart sounds. 

Absent: systolic murmur, diastolic murmur, rubs, gallop, clicks


GI/Abdominal exam: Present: soft, tenderness (Suprapubic tenderness), normal 

bowel sounds.  Absent: distended, guarding, rebound, rigid


External exam: Present: normal external exam


Speculum exam: Present: vaginal bleeding (Dark red vaginal bleeding noted), 

other (No blood clots noted. Cervical os is open)


By manual exam: Present: uterine tenderness


Neurological exam: Present: alert, oriented X3, CN II-XII intact


Psychiatric exam: Present: normal affect, normal mood


Skin exam: Present: warm, dry, intact, normal color.  Absent: rash





Course


                                   Vital Signs











  21





  20:31 21:54 00:16


 


Temperature 97.9 F  98.1 F


 


Pulse Rate 120 H 91 99


 


Respiratory 19 18 18





Rate   


 


Blood Pressure 114/79 94/67 108/65


 


O2 Sat by Pulse 98 99 100





Oximetry   














Medical Decision Making





- Medical Decision Making


25-year-old female patient presents to the emergency department today for 

evaluation of possible miscarriage.  Physical examination did reveal suprapubic 

tenderness.  Pelvic exam was performed and did show dark red blood in the 

vaginal vault.  No clots were noted.  Cervical os was open labs reviewed and did

reveal white blood cell count at 13.7.  AST ALT mildly elevated.  HCG was 

1215.3.  Ultrasound was performed and did show irregular gestational sac and the

uterus with blood clots noted possibly in the vagina or cervix.  Findings 

consistent with incomplete .  Is also discussed with the patient.  

She'll be discharged to follow up with her OB/GYN for recheck in 1-2 days.  She 

is given lab slip to have repeat hCG performed in 2 days.  Return parameters 

were discussed in detail.  She verbalizes understanding and agrees with this 

plan.  Case discussed with my attending Dr. Meade.





- Lab Data


Result diagrams: 


                                 21 21:53





                                 21 21:53


                                   Lab Results











  21 Range/Units





  21:51 21:53 21:53 


 


WBC   13.7 H   (3.8-10.6)  k/uL


 


RBC   4.29   (3.80-5.40)  m/uL


 


Hgb   13.2   (11.4-16.0)  gm/dL


 


Hct   38.2   (34.0-46.0)  %


 


MCV   89.1   (80.0-100.0)  fL


 


MCH   30.7   (25.0-35.0)  pg


 


MCHC   34.5   (31.0-37.0)  g/dL


 


RDW   12.7   (11.5-15.5)  %


 


Plt Count   278   (150-450)  k/uL


 


MPV   8.4   


 


Neutrophils %   74   %


 


Lymphocytes %   19   %


 


Monocytes %   4   %


 


Eosinophils %   2   %


 


Basophils %   1   %


 


Neutrophils #   10.1 H   (1.3-7.7)  k/uL


 


Lymphocytes #   2.6   (1.0-4.8)  k/uL


 


Monocytes #   0.5   (0-1.0)  k/uL


 


Eosinophils #   0.3   (0-0.7)  k/uL


 


Basophils #   0.1   (0-0.2)  k/uL


 


Sodium    135 L  (137-145)  mmol/L


 


Potassium    3.8  (3.5-5.1)  mmol/L


 


Chloride    101  ()  mmol/L


 


Carbon Dioxide    25  (22-30)  mmol/L


 


Anion Gap    9  mmol/L


 


BUN    11  (7-17)  mg/dL


 


Creatinine    0.82  (0.52-1.04)  mg/dL


 


Est GFR (CKD-EPI)AfAm    >90  (>60 ml/min/1.73 sqM)  


 


Est GFR (CKD-EPI)NonAf    >90  (>60 ml/min/1.73 sqM)  


 


Glucose    110 H  (74-99)  mg/dL


 


Calcium    9.6  (8.4-10.2)  mg/dL


 


Total Bilirubin    0.3  (0.2-1.3)  mg/dL


 


AST    52 H  (14-36)  U/L


 


ALT    39 H  (4-34)  U/L


 


Alkaline Phosphatase    82  ()  U/L


 


Total Protein    7.4  (6.3-8.2)  g/dL


 


Albumin    4.5  (3.5-5.0)  g/dL


 


HCG, Quant    1215.3  mIU/mL


 


Blood Type  O Positive    


 


Blood Type Recheck  No Previous Record    


 


Bld Type Recheck Status  [facility] ONLY    














- Radiology Data


Radiology results: report reviewed


Fetal ultrasound was obtained.  Report was reviewed in its entirety.  Impression

by Dr. Kahn shows 7 x 4 cm echogenic area in the vaginal vault pelvis a 

blood clot.  No evidence for intrauterine gestational sac.  No adnexal mass.  

Findings are suggestive of incomplete .





Disposition


Clinical Impression: 


 Miscarriage





Disposition: HOME SELF-CARE


Condition: Good


Instructions (If sedation given, give patient instructions):  Miscarriage (ED)


Additional Instructions: 


Have repeat HCG level drawn in 2 days. Call your OBGYN first thing in the 

morning for an appointment. Return to the emergency department for further 

evaluation for any new, worsening, or concerning symptoms.


Is patient prescribed a controlled substance at d/c from ED?: No


Referrals: 


Ilia Holt DO [Primary Care Provider] - 1-2 days


Guadalupe Everett DO [Doctor of Osteopathic Medicine] - 1-2 days


Time of Disposition: 00:29

## 2021-07-17 ENCOUNTER — HOSPITAL ENCOUNTER (EMERGENCY)
Dept: HOSPITAL 47 - EC | Age: 26
Discharge: HOME | End: 2021-07-17
Payer: COMMERCIAL

## 2021-07-17 VITALS
HEART RATE: 100 BPM | RESPIRATION RATE: 18 BRPM | SYSTOLIC BLOOD PRESSURE: 139 MMHG | TEMPERATURE: 97.8 F | DIASTOLIC BLOOD PRESSURE: 87 MMHG

## 2021-07-17 DIAGNOSIS — F17.290: ICD-10-CM

## 2021-07-17 DIAGNOSIS — F12.90: ICD-10-CM

## 2021-07-17 DIAGNOSIS — F41.9: ICD-10-CM

## 2021-07-17 DIAGNOSIS — N13.30: Primary | ICD-10-CM

## 2021-07-17 DIAGNOSIS — F31.9: ICD-10-CM

## 2021-07-17 LAB
ALBUMIN SERPL-MCNC: 4.5 G/DL (ref 3.5–5)
ALP SERPL-CCNC: 136 U/L (ref 38–126)
ALT SERPL-CCNC: 24 U/L (ref 4–34)
ANION GAP SERPL CALC-SCNC: 8 MMOL/L
AST SERPL-CCNC: 32 U/L (ref 14–36)
BASOPHILS # BLD AUTO: 0.1 K/UL (ref 0–0.2)
BASOPHILS NFR BLD AUTO: 1 %
BUN SERPL-SCNC: 9 MG/DL (ref 7–17)
CALCIUM SPEC-MCNC: 9.4 MG/DL (ref 8.4–10.2)
CHLORIDE SERPL-SCNC: 97 MMOL/L (ref 98–107)
CO2 SERPL-SCNC: 26 MMOL/L (ref 22–30)
EOSINOPHIL # BLD AUTO: 0.2 K/UL (ref 0–0.7)
EOSINOPHIL NFR BLD AUTO: 2 %
ERYTHROCYTE [DISTWIDTH] IN BLOOD BY AUTOMATED COUNT: 4.51 M/UL (ref 3.8–5.4)
ERYTHROCYTE [DISTWIDTH] IN BLOOD: 15.8 % (ref 11.5–15.5)
GLUCOSE SERPL-MCNC: 89 MG/DL (ref 74–99)
HCT VFR BLD AUTO: 36.1 % (ref 34–46)
HGB BLD-MCNC: 12 GM/DL (ref 11.4–16)
LIPASE SERPL-CCNC: 26 U/L (ref 23–300)
LYMPHOCYTES # SPEC AUTO: 2 K/UL (ref 1–4.8)
LYMPHOCYTES NFR SPEC AUTO: 18 %
MCH RBC QN AUTO: 26.6 PG (ref 25–35)
MCHC RBC AUTO-ENTMCNC: 33.3 G/DL (ref 31–37)
MCV RBC AUTO: 79.9 FL (ref 80–100)
MONOCYTES # BLD AUTO: 0.7 K/UL (ref 0–1)
MONOCYTES NFR BLD AUTO: 6 %
NEUTROPHILS # BLD AUTO: 7.7 K/UL (ref 1.3–7.7)
NEUTROPHILS NFR BLD AUTO: 71 %
PH UR: 7 [PH] (ref 5–8)
PLATELET # BLD AUTO: 260 K/UL (ref 150–450)
POTASSIUM SERPL-SCNC: 3.8 MMOL/L (ref 3.5–5.1)
PROT SERPL-MCNC: 7.4 G/DL (ref 6.3–8.2)
RBC UR QL: <1 /HPF (ref 0–5)
SODIUM SERPL-SCNC: 131 MMOL/L (ref 137–145)
SP GR UR: 1 (ref 1–1.03)
UROBILINOGEN UR QL STRIP: <2 MG/DL (ref ?–2)
WBC # BLD AUTO: 10.9 K/UL (ref 3.8–10.6)
WBC # UR AUTO: 1 /HPF (ref 0–5)

## 2021-07-17 PROCEDURE — 81025 URINE PREGNANCY TEST: CPT

## 2021-07-17 PROCEDURE — 74177 CT ABD & PELVIS W/CONTRAST: CPT

## 2021-07-17 PROCEDURE — 83690 ASSAY OF LIPASE: CPT

## 2021-07-17 PROCEDURE — 96374 THER/PROPH/DIAG INJ IV PUSH: CPT

## 2021-07-17 PROCEDURE — 85025 COMPLETE CBC W/AUTO DIFF WBC: CPT

## 2021-07-17 PROCEDURE — 81001 URINALYSIS AUTO W/SCOPE: CPT

## 2021-07-17 PROCEDURE — 99284 EMERGENCY DEPT VISIT MOD MDM: CPT

## 2021-07-17 PROCEDURE — 36415 COLL VENOUS BLD VENIPUNCTURE: CPT

## 2021-07-17 PROCEDURE — 80053 COMPREHEN METABOLIC PANEL: CPT

## 2021-07-17 NOTE — ED
Female Urogenital HPI





- General


Chief complaint: Urogenital


Stated complaint: Pain while urinating/Lower Back Pain


Time Seen by Provider: 07/17/21 19:15


Source: patient


Mode of arrival: ambulatory


Limitations: no limitations





- History of Present Illness


Initial comments: 





Patient is a 18-year-old female with no past medical history who presents to the

emergency room with reported left flank pain.  States the pain has been present 

for the past 3 days.  She has associated dysuria and increased frequency of 

voiding.  No history of kidney stones.  Denies hematuria.  No abnormal vaginal 

bleeding or discharge.  No concern for pregnancy.  Denies any changes in her 

bowel habits.  She has not taken anything for pain at home.  No recent trauma.  

No other alleviating, precipitating or modifying factors





- Related Data


                                Home Medications











 Medication  Instructions  Recorded  Confirmed


 


Albuterol Sulfate [Ventolin HFA] 1 - 2 puff INHALATION RT-Q6H PRN 10/12/19 

10/12/19


 


Dextroamphetamine/Amphetamine 30 mg PO BID 10/12/19 10/12/19





[Adderall]   


 


Loratadine [Claritin] 10 mg PO DAILY 10/12/19 10/12/19


 


Vienva 0.1/0.2mg 1 tab PO DAILY 10/12/19 10/12/19








                                  Previous Rx's











 Medication  Instructions  Recorded


 


Clindamycin HCl 300 mg PO Q6HR #40 cap 08/18/20


 


Triamcinolone 0.1% Cream [Kenalog 1 applicatio TOPICAL BID #15 gram 08/18/20





0.1% Cream]  


 


Mupirocin 2% Oint [Bactroban 2% 1 applic TOPICAL TID 5 Days #15 gm 11/08/20





Oint]  


 


lidocaine HCL [Lidocaine HCl 5 ml MM Q6H PRN #50 ml 11/08/20





Viscous]  


 


valACYclovir [Valtrex] 1,000 mg PO BID 1 Days #4 tab 11/08/20


 


HYDROcodone/APAP 5-325MG [Norco 5] 1 each PO Q6HR PRN #12 tab 07/17/21











                                    Allergies











Allergy/AdvReac Type Severity Reaction Status Date / Time


 


methylphenidate HCl Allergy  Unknown Verified 07/17/21 19:20





[From Metadate CD]     


 


sulfamethoxazole Allergy  Rash/Hives Verified 07/17/21 19:20





[From Bactrim]     


 


trimethoprim [From Bactrim] Allergy  Rash/Hives Verified 07/17/21 19:20


 


cephalexin [From Keflex] AdvReac  Nausea & Verified 07/17/21 19:20





   Vomiting  














Review of Systems


ROS Statement: 


Those systems with pertinent positive or pertinent negative responses have been 

documented in the HPI.





ROS Other: All systems not noted in ROS Statement are negative.





Past Medical History


Past Medical History: No Reported History


Additional Past Medical History / Comment(s): hypoglycemia, elevated liver 

enzymes


History of Any Multi-Drug Resistant Organisms: None Reported


Past Surgical History: Tonsillectomy


Additional Past Surgical History / Comment(s): oral surgery


Past Anesthesia/Blood Transfusion Reactions: No Reported Reaction


Past Psychological History: Anxiety, Bipolar, Depression


Smoking Status: Current every day smoker, Vaper


Past Alcohol Use History: Occasional


Past Drug Use History: Marijuana





- Past Family History


  ** Mother


Family Medical History: Asthma, Thyroid Disorder





General Exam


Limitations: no limitations


General appearance: alert, in no apparent distress


Head exam: Present: atraumatic, normocephalic, normal inspection


Eye exam: Present: normal appearance, PERRL, EOMI.  Absent: scleral icterus, 

conjunctival injection, periorbital swelling


ENT exam: Present: normal exam, mucous membranes moist


Neck exam: Present: normal inspection.  Absent: tenderness, meningismus, 

lymphadenopathy


Respiratory exam: Present: normal lung sounds bilaterally.  Absent: respiratory 

distress, wheezes, rales, rhonchi, stridor


Cardiovascular Exam: Present: regular rate, normal rhythm, normal heart sounds. 

Absent: systolic murmur, diastolic murmur, rubs, gallop, clicks


GI/Abdominal exam: Present: soft, normal bowel sounds.  Absent: distended, 

tenderness, guarding, rebound, rigid


Extremities exam: Present: normal inspection, full ROM, normal capillary refill.

 Absent: tenderness, pedal edema, joint swelling, calf tenderness


Back exam: Present: CVA tenderness (L)


Neurological exam: Present: alert, oriented X3, CN II-XII intact


Psychiatric exam: Present: normal affect, normal mood


Skin exam: Present: warm, dry, intact, normal color.  Absent: rash





Course


                                   Vital Signs











  07/17/21





  19:13


 


Temperature 97.8 F


 


Pulse Rate 100


 


Respiratory 18





Rate 


 


Blood Pressure 139/87


 


O2 Sat by Pulse 100





Oximetry 














Medical Decision Making





- Medical Decision Making





On arrival patient's placed in room 26.  H?istory and physical exam is 

performed.  IV is established.  Laboratory studies were conducted.  Patient was 

given Toradol for pain control.  Laboratory studies are reviewed.  Urinalysis is

positive for leukocyte esterase however no bacteria.  CT is performed due to the

significance patient's pain which does demonstrate bilateral hydronephrosis and 

mild hydroureter.  Results are discussed the patient.  She states her pain is 

completely resolved at this time.  I did discuss diagnosis, differential and 

treatment options.  At this time patient will be discharged home and is to 

follow-up with primary care doctor 2-4 days.  She was given urology follow-up 

for her hydronephrosis.  Return to the emergency room for any worsening 

symptoms.  Patient discharged in stable condition





- Lab Data


Result diagrams: 


                                 07/17/21 20:32





                                 07/17/21 20:32


                                   Lab Results











  07/17/21 07/17/21 07/17/21 Range/Units





  19:28 19:28 20:32 


 


WBC    10.9 H  (3.8-10.6)  k/uL


 


RBC    4.51  (3.80-5.40)  m/uL


 


Hgb    12.0  (11.4-16.0)  gm/dL


 


Hct    36.1  (34.0-46.0)  %


 


MCV    79.9 L  (80.0-100.0)  fL


 


MCH    26.6  (25.0-35.0)  pg


 


MCHC    33.3  (31.0-37.0)  g/dL


 


RDW    15.8 H  (11.5-15.5)  %


 


Plt Count    260  (150-450)  k/uL


 


MPV    8.9  


 


Neutrophils %    71  %


 


Lymphocytes %    18  %


 


Monocytes %    6  %


 


Eosinophils %    2  %


 


Basophils %    1  %


 


Neutrophils #    7.7  (1.3-7.7)  k/uL


 


Lymphocytes #    2.0  (1.0-4.8)  k/uL


 


Monocytes #    0.7  (0-1.0)  k/uL


 


Eosinophils #    0.2  (0-0.7)  k/uL


 


Basophils #    0.1  (0-0.2)  k/uL


 


Sodium     (137-145)  mmol/L


 


Potassium     (3.5-5.1)  mmol/L


 


Chloride     ()  mmol/L


 


Carbon Dioxide     (22-30)  mmol/L


 


Anion Gap     mmol/L


 


BUN     (7-17)  mg/dL


 


Creatinine     (0.52-1.04)  mg/dL


 


Est GFR (CKD-EPI)AfAm     (>60 ml/min/1.73 sqM)  


 


Est GFR (CKD-EPI)NonAf     (>60 ml/min/1.73 sqM)  


 


Glucose     (74-99)  mg/dL


 


Calcium     (8.4-10.2)  mg/dL


 


Total Bilirubin     (0.2-1.3)  mg/dL


 


AST     (14-36)  U/L


 


ALT     (4-34)  U/L


 


Alkaline Phosphatase     ()  U/L


 


Total Protein     (6.3-8.2)  g/dL


 


Albumin     (3.5-5.0)  g/dL


 


Lipase     ()  U/L


 


Urine Color  Colorless    


 


Urine Appearance  Clear    (Clear)  


 


Urine pH  7.0    (5.0-8.0)  


 


Ur Specific Gravity  1.000 L    (1.001-1.035)  


 


Urine Protein  Negative    (Negative)  


 


Urine Glucose (UA)  Negative    (Negative)  


 


Urine Ketones  Negative    (Negative)  


 


Urine Blood  Negative    (Negative)  


 


Urine Nitrite  Negative    (Negative)  


 


Urine Bilirubin  Negative    (Negative)  


 


Urine Urobilinogen  <2.0    (<2.0)  mg/dL


 


Ur Leukocyte Esterase  Moderate H    (Negative)  


 


Urine RBC  <1    (0-5)  /hpf


 


Urine WBC  1    (0-5)  /hpf


 


Urine HCG, Qual   Not Detected   (Not Detectd)  














  07/17/21 Range/Units





  20:32 


 


WBC   (3.8-10.6)  k/uL


 


RBC   (3.80-5.40)  m/uL


 


Hgb   (11.4-16.0)  gm/dL


 


Hct   (34.0-46.0)  %


 


MCV   (80.0-100.0)  fL


 


MCH   (25.0-35.0)  pg


 


MCHC   (31.0-37.0)  g/dL


 


RDW   (11.5-15.5)  %


 


Plt Count   (150-450)  k/uL


 


MPV   


 


Neutrophils %   %


 


Lymphocytes %   %


 


Monocytes %   %


 


Eosinophils %   %


 


Basophils %   %


 


Neutrophils #   (1.3-7.7)  k/uL


 


Lymphocytes #   (1.0-4.8)  k/uL


 


Monocytes #   (0-1.0)  k/uL


 


Eosinophils #   (0-0.7)  k/uL


 


Basophils #   (0-0.2)  k/uL


 


Sodium  131 L  (137-145)  mmol/L


 


Potassium  3.8  (3.5-5.1)  mmol/L


 


Chloride  97 L  ()  mmol/L


 


Carbon Dioxide  26  (22-30)  mmol/L


 


Anion Gap  8  mmol/L


 


BUN  9  (7-17)  mg/dL


 


Creatinine  0.81  (0.52-1.04)  mg/dL


 


Est GFR (CKD-EPI)AfAm  >90  (>60 ml/min/1.73 sqM)  


 


Est GFR (CKD-EPI)NonAf  >90  (>60 ml/min/1.73 sqM)  


 


Glucose  89  (74-99)  mg/dL


 


Calcium  9.4  (8.4-10.2)  mg/dL


 


Total Bilirubin  0.8  (0.2-1.3)  mg/dL


 


AST  32  (14-36)  U/L


 


ALT  24  (4-34)  U/L


 


Alkaline Phosphatase  136 H  ()  U/L


 


Total Protein  7.4  (6.3-8.2)  g/dL


 


Albumin  4.5  (3.5-5.0)  g/dL


 


Lipase  26  ()  U/L


 


Urine Color   


 


Urine Appearance   (Clear)  


 


Urine pH   (5.0-8.0)  


 


Ur Specific Gravity   (1.001-1.035)  


 


Urine Protein   (Negative)  


 


Urine Glucose (UA)   (Negative)  


 


Urine Ketones   (Negative)  


 


Urine Blood   (Negative)  


 


Urine Nitrite   (Negative)  


 


Urine Bilirubin   (Negative)  


 


Urine Urobilinogen   (<2.0)  mg/dL


 


Ur Leukocyte Esterase   (Negative)  


 


Urine RBC   (0-5)  /hpf


 


Urine WBC   (0-5)  /hpf


 


Urine HCG, Qual   (Not Detectd)  














Disposition


Clinical Impression: 


 Flank pain, Hydronephrosis





Disposition: HOME SELF-CARE


Condition: Stable


Instructions (If sedation given, give patient instructions):  Flank Pain (ED)


Additional Instructions: 


Please follow-up with your primary care doctor in 2-4 days.  Follow-up with 

urologist.  You may need an ultrasound of your kidneys.  Return to the emergency

room for any new or worsening symptoms


Prescriptions: 


HYDROcodone/APAP 5-325MG [Norco 5] 1 each PO Q6HR PRN #12 tab


 PRN Reason: Pain


Is patient prescribed a controlled substance at d/c from ED?: Yes


When asked, does pt state using other controlled substances?: No


If prescribed controlled substance>3 days was MAPS reviewed?: Prescribed <3 Days


If opioid is for acute pain is fill amount 7 days or less?: Yes


If Rx opioid, was Start Talking consent form obtained?: Yes


Referrals: 


Ilia Holt DO [Primary Care Provider] - 1-2 days


Andrew Sahni MD [STAFF PHYSICIAN] - 1-2 days


Time of Disposition: 21:35

## 2021-07-17 NOTE — CT
EXAMINATION TYPE: CT abdomen pelvis w con

 

DATE OF EXAM: 7/17/2021

 

COMPARISON: 8/29/2013

 

HISTORY: Abdominal pain

 

CT DLP: 658.6 mGycm

Automated exposure control for dose reduction was used.

 

CONTRAST: 

Performed with IV Contrast, patient injected with 100 mL of Isovue 300.

 

Images obtained from the diaphragm to the floor the pelvis with IV contrast.

 

Lung bases are clear. There is no pleural effusion. Heart size is normal. There is no pericardial eff
usion.

 

Liver spleen stomach pancreas gallbladder appear normal. The bile ducts are not dilated.

 

There is no adrenal mass. Kidneys have normal size. There is bilateral mild hydronephrosis. Delayed i
mages show symmetric renal excretion. There is minimal stranding around the left ureter. Bladder dist
ends smoothly. There is no inguinal hernia. There is no free fluid in the pelvis.

 

The lumbar vertebra have normal alignment. Posterior elements are intact. There is no compression fra
cture. Bony pelvis is intact. The hip joints are intact.

 

There is no mesenteric edema. There is no ascites or free air. There is no sign of a bowel obstructio
n. Appendix not definitely seen. No sign of thickened appendix.

 

IMPRESSION:

There is bilateral hydronephrosis and mild hydroureter which is a change compared to old exam no obst
ructing calculus seen. This could relate to pyelonephritis and ureteritis.

## 2022-02-02 ENCOUNTER — HOSPITAL ENCOUNTER (EMERGENCY)
Dept: HOSPITAL 47 - EC | Age: 27
Discharge: HOME | End: 2022-02-02
Payer: COMMERCIAL

## 2022-02-02 VITALS
TEMPERATURE: 97.1 F | RESPIRATION RATE: 16 BRPM | HEART RATE: 99 BPM | DIASTOLIC BLOOD PRESSURE: 84 MMHG | SYSTOLIC BLOOD PRESSURE: 134 MMHG

## 2022-02-02 DIAGNOSIS — Z79.51: ICD-10-CM

## 2022-02-02 DIAGNOSIS — F17.290: ICD-10-CM

## 2022-02-02 DIAGNOSIS — F12.90: ICD-10-CM

## 2022-02-02 DIAGNOSIS — F31.9: ICD-10-CM

## 2022-02-02 DIAGNOSIS — K02.9: Primary | ICD-10-CM

## 2022-02-02 DIAGNOSIS — Z79.899: ICD-10-CM

## 2022-02-02 DIAGNOSIS — F41.9: ICD-10-CM

## 2022-02-02 DIAGNOSIS — K11.20: ICD-10-CM

## 2022-02-02 PROCEDURE — 99282 EMERGENCY DEPT VISIT SF MDM: CPT

## 2022-02-02 NOTE — ED
ENT HPI





- General


Chief complaint: Dental/Oral


Stated complaint: Dental Pain


Time Seen by Provider: 02/02/22 04:44


Source: patient


Mode of arrival: ambulatory





- History of Present Illness


Initial comments: 





This patient is 26-year-old woman who presents to be evaluated for some mild 

swelling noted in her left cheek.  She states that this would come on tonight.  

She states that she Put some pressure on the cheek and then she felt very mild 

pop and had a little bit of drainage from the cheek, she indicates the salivary 

duct.  While she is being evaluated she also wanted her left maxillary, #13 

tooth looked at.  The patient states she has long-standing decay to that tooth. 

Neck currently having pain there.  No retro-orbital or eye pain.  No fever or 

chills.  No neck pain or swelling.  No change in speech or swallowing


MD complaint: tooth pain, other


-: days(s)


Location: tooth # (13)


Severity: mild


Quality: dull


Consistency: now resolved


Improves with: none


Worsens with: none


Context- Dental: history of dental caries


Associated Symptoms: other





- Related Data


                                Home Medications











 Medication  Instructions  Recorded  Confirmed


 


Albuterol Sulfate [Ventolin HFA] 1 - 2 puff INHALATION RT-Q6H PRN 10/12/19 

10/12/19


 


Dextroamphetamine/Amphetamine 30 mg PO BID 10/12/19 10/12/19





[Adderall]   


 


Loratadine [Claritin] 10 mg PO DAILY 10/12/19 10/12/19


 


Vienva 0.1/0.2mg 1 tab PO DAILY 10/12/19 10/12/19








                                  Previous Rx's











 Medication  Instructions  Recorded


 


Clindamycin HCl 300 mg PO Q6HR #40 cap 08/18/20


 


Triamcinolone 0.1% Cream [Kenalog 1 applicatio TOPICAL BID #15 gram 08/18/20





0.1% Cream]  


 


Mupirocin 2% Oint [Bactroban 2% 1 applic TOPICAL TID 5 Days #15 gm 11/08/20





Oint]  


 


lidocaine HCL [Lidocaine HCl 5 ml MM Q6H PRN #50 ml 11/08/20





Viscous]  


 


valACYclovir [Valtrex] 1,000 mg PO BID 1 Days #4 tab 11/08/20


 


HYDROcodone/APAP 5-325MG [Norco 5] 1 each PO Q6HR PRN #12 tab 07/17/21


 


Amoxicillin 875 mg PO Q12HR #14 tablet 02/02/22











                                    Allergies











Allergy/AdvReac Type Severity Reaction Status Date / Time


 


azithromycin Allergy  Rash/Hives Verified 02/02/22 02:44


 


methylphenidate HCl Allergy  Unknown Verified 02/02/22 02:44





[From Metadate CD]     


 


sulfamethoxazole Allergy  Rash/Hives Verified 02/02/22 02:44





[From Bactrim]     


 


trimethoprim [From Bactrim] Allergy  Rash/Hives Verified 02/02/22 02:44


 


cephalexin [From Keflex] AdvReac  Nausea & Verified 02/02/22 02:44





   Vomiting  














Review of Systems


ROS Statement: 


Those systems with pertinent positive or pertinent negative responses have been 

documented in the HPI.





ROS Other: All systems not noted in ROS Statement are negative.


Constitutional: Denies: fever, chills


Eyes: Denies: eye pain, vision change


ENT: Reports: dental pain, other (Left cheek)


Respiratory: Denies: cough, dyspnea


Cardiovascular: Denies: palpitations


Neurological: Denies: headache





Past Medical History


Past Medical History: No Reported History


Additional Past Medical History / Comment(s): hypoglycemia, elevated liver 

enzymes


History of Any Multi-Drug Resistant Organisms: None Reported


Past Surgical History: Tonsillectomy


Additional Past Surgical History / Comment(s): oral surgery


Past Anesthesia/Blood Transfusion Reactions: No Reported Reaction


Past Psychological History: Anxiety, Bipolar, Depression


Smoking Status: Current every day smoker, Vaper


Past Alcohol Use History: Occasional


Past Drug Use History: Marijuana





- Past Family History


  ** Mother


Family Medical History: Asthma, Thyroid Disorder





General Exam


General appearance: alert, in no apparent distress


Head exam: Present: atraumatic, normocephalic


Eye exam: Present: normal appearance, PERRL, EOMI.  Absent: scleral icterus, 

conjunctival injection, nystagmus


ENT exam: Present: mucous membranes moist, TM's normal bilaterally, normal 

external ear exam, other (There is caries of tooth #13 down to the gumline.  No 

abscess.  No erythema.  The patient does have some minimal swelling and 

tenderness over the left parotid.  No erythema, warmth or active drainage.)


Neck exam: Present: normal inspection, full ROM.  Absent: tenderness, 

meningismus, lymphadenopathy


Skin exam: Present: warm, dry, intact, normal color.  Absent: rash





Course


                                   Vital Signs











  02/02/22





  02:37


 


Temperature 97.1 F L


 


Pulse Rate 99


 


Respiratory 16





Rate 


 


Blood Pressure 134/84


 


O2 Sat by Pulse 100





Oximetry 














Disposition


Clinical Impression: 


 Dental caries, Sialadenitis





Disposition: HOME SELF-CARE


Condition: Good


Instructions (If sedation given, give patient instructions):  Parotid Duct 

Obstruction (ED), Toothache (ED)


Prescriptions: 


Amoxicillin 875 mg PO Q12HR #14 tablet


Is patient prescribed a controlled substance at d/c from ED?: No


Referrals: 


Ilia Holt DO [Primary Care Provider] - 1-2 days

## 2022-07-11 ENCOUNTER — HOSPITAL ENCOUNTER (EMERGENCY)
Dept: HOSPITAL 47 - EC | Age: 27
Discharge: HOME | End: 2022-07-11
Payer: COMMERCIAL

## 2022-07-11 VITALS
SYSTOLIC BLOOD PRESSURE: 132 MMHG | RESPIRATION RATE: 18 BRPM | HEART RATE: 82 BPM | TEMPERATURE: 98.2 F | DIASTOLIC BLOOD PRESSURE: 78 MMHG

## 2022-07-11 DIAGNOSIS — Z88.2: ICD-10-CM

## 2022-07-11 DIAGNOSIS — M54.50: Primary | ICD-10-CM

## 2022-07-11 DIAGNOSIS — Z88.1: ICD-10-CM

## 2022-07-11 DIAGNOSIS — F17.290: ICD-10-CM

## 2022-07-11 DIAGNOSIS — Z88.8: ICD-10-CM

## 2022-07-11 LAB
PH UR: 6 [PH] (ref 5–8)
RBC UR QL: 1 /HPF (ref 0–5)
SP GR UR: 1.03 (ref 1–1.03)
SQUAMOUS UR QL AUTO: 9 /HPF (ref 0–4)
UROBILINOGEN UR QL STRIP: <2 MG/DL (ref ?–2)
WBC # UR AUTO: 1 /HPF (ref 0–5)

## 2022-07-11 PROCEDURE — 81001 URINALYSIS AUTO W/SCOPE: CPT

## 2022-07-11 PROCEDURE — 72100 X-RAY EXAM L-S SPINE 2/3 VWS: CPT

## 2022-07-11 PROCEDURE — 99283 EMERGENCY DEPT VISIT LOW MDM: CPT

## 2022-07-11 PROCEDURE — 81025 URINE PREGNANCY TEST: CPT

## 2022-07-11 NOTE — ED
General Adult HPI





- General


Chief complaint: Back Pain/Injury


Stated complaint: Back pain


Time Seen by Provider: 07/11/22 11:23


Source: patient, RN notes reviewed, old records reviewed


Mode of arrival: ambulatory


Limitations: no limitations





- History of Present Illness


Initial comments: 





27-year-old female with 2 weeks of right-sided low back pain.  Patient believes 

she may have injured herself while at work.  She has a nurse assistant and moves

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  She denies a specific fall or trauma.  She denies

bowel or bladder incontinence.  Denies numbness or tingling to the lower 

extremities.  No fevers.  She has been stretching and taking Motrin with only 

minimal relief.





- Related Data


                                Home Medications











 Medication  Instructions  Recorded  Confirmed


 


Albuterol Sulfate [Ventolin HFA] 1 - 2 puff INHALATION RT-Q6H PRN 10/12/19 

10/12/19


 


Dextroamphetamine/Amphetamine 30 mg PO BID 10/12/19 10/12/19





[Adderall]   


 


Loratadine [Claritin] 10 mg PO DAILY 10/12/19 10/12/19


 


Vienva 0.1/0.2mg 1 tab PO DAILY 10/12/19 10/12/19








                                  Previous Rx's











 Medication  Instructions  Recorded


 


Triamcinolone 0.1% Cream [Kenalog 1 applicatio TOPICAL BID #15 gram 08/18/20





0.1% Cream]  


 


clindamycin HCL [Clindamycin HCl] 300 mg PO Q6HR #40 cap 08/18/20


 


Mupirocin 2% Oint [Bactroban 2% 1 applic TOPICAL TID 5 Days #15 gm 11/08/20





Oint]  


 


lidocaine HCL [Lidocaine HCl 5 ml MM Q6H PRN #50 ml 11/08/20





Viscous]  


 


valACYclovir HCL [Valtrex] 1,000 mg PO BID 1 Days #4 tab 11/08/20


 


HYDROcodone/APAP 5-325MG [Norco 5] 1 each PO Q6HR PRN #12 tab 07/17/21


 


Amoxicillin 875 mg PO Q12HR #14 tablet 02/02/22


 


Cyclobenzaprine [Flexeril] 5 mg PO HS 3 Days #3 tab 07/11/22


 


Ibuprofen [Motrin] 600 mg PO Q8HR PRN #24 tab 07/11/22











                                    Allergies











Allergy/AdvReac Type Severity Reaction Status Date / Time


 


azithromycin Allergy  Rash/Hives Verified 07/11/22 11:12


 


methylphenidate HCl Allergy  Unknown Verified 07/11/22 11:12





[From Metadate CD]     


 


sulfamethoxazole Allergy  Rash/Hives Verified 07/11/22 11:12





[From Bactrim]     


 


trimethoprim [From Bactrim] Allergy  Rash/Hives Verified 07/11/22 11:12


 


cephalexin [From Keflex] AdvReac  Nausea & Verified 07/11/22 11:12





   Vomiting  














Review of Systems


ROS Statement: 


Those systems with pertinent positive or pertinent negative responses have been 

documented in the HPI.





ROS Other: All systems not noted in ROS Statement are negative.





Past Medical History


Past Medical History: No Reported History


Additional Past Medical History / Comment(s): hypoglycemia, elevated liver 

enzymes


History of Any Multi-Drug Resistant Organisms: None Reported


Past Surgical History: Tonsillectomy


Additional Past Surgical History / Comment(s): oral surgery


Past Anesthesia/Blood Transfusion Reactions: No Reported Reaction


Past Psychological History: Anxiety, Bipolar, Depression


Smoking Status: Current every day smoker, Vaper


Past Alcohol Use History: Occasional


Past Drug Use History: Marijuana





- Past Family History


  ** Mother


Family Medical History: Asthma, Thyroid Disorder





General Exam


Limitations: no limitations


General appearance: alert, in no apparent distress


Head exam: Present: atraumatic, normocephalic


Eye exam: Present: normal appearance, PERRL


ENT exam: Present: normal exam


Neck exam: Present: normal inspection.  Absent: tenderness


Respiratory exam: Present: normal lung sounds bilaterally.  Absent: respiratory 

distress, wheezes


Cardiovascular Exam: Present: regular rate, normal rhythm


GI/Abdominal exam: Present: soft.  Absent: distended, tenderness


Extremities exam: Present: normal inspection, normal capillary refill


Back exam: Present: paraspinal tenderness (Right-sided).  Absent: vertebral 

tenderness


Neurological exam: Present: alert, oriented X3, CN II-XII intact, normal gait.  

Absent: motor sensory deficit


Psychiatric exam: Present: normal affect, normal mood


Skin exam: Present: warm, dry, intact.  Absent: cyanosis, diaphoretic





Course


                                   Vital Signs











  07/11/22





  11:09


 


Temperature 98.2 F


 


Pulse Rate 82


 


Respiratory 18





Rate 


 


Blood Pressure 132/78


 


O2 Sat by Pulse 99





Oximetry 














Medical Decision Making





- Medical Decision Making





27-year-old female with mechanical back pain, no red flags features, x-ray 

negative for acute process.  Normal urinalysis..  Patient is not pregnant.  She 

is prescribed Motrin and Flexeril.  Return parameters discussed.





- Lab Data


                                   Lab Results











  07/11/22 07/11/22 Range/Units





  11:36 11:36 


 


Urine Color   Yellow  


 


Urine Appearance   Cloudy H  (Clear)  


 


Urine pH   6.0  (5.0-8.0)  


 


Ur Specific Gravity   1.027  (1.001-1.035)  


 


Urine Protein   Trace H  (Negative)  


 


Urine Glucose (UA)   Negative  (Negative)  


 


Urine Ketones   Negative  (Negative)  


 


Urine Blood   Negative  (Negative)  


 


Urine Nitrite   Negative  (Negative)  


 


Urine Bilirubin   Negative  (Negative)  


 


Urine Urobilinogen   <2.0  (<2.0)  mg/dL


 


Ur Leukocyte Esterase   Negative  (Negative)  


 


Urine RBC   1  (0-5)  /hpf


 


Urine WBC   1  (0-5)  /hpf


 


Ur Squamous Epith Cells   9 H  (0-4)  /hpf


 


Urine Mucus   Occasional H  (None)  /hpf


 


Urine HCG, Qual  Not Detected   (Not Detectd)  














Disposition


Clinical Impression: 


 Mechanical back pain





Disposition: HOME SELF-CARE


Condition: Good


Instructions (If sedation given, give patient instructions):  Acute Low Back 

Pain (ED)


Prescriptions: 


Cyclobenzaprine [Flexeril] 5 mg PO HS 3 Days #3 tab


Ibuprofen [Motrin] 600 mg PO Q8HR PRN #24 tab


 PRN Reason: Pain


Is patient prescribed a controlled substance at d/c from ED?: No


Referrals: 


None,Stated [Primary Care Provider] - 1-2 days


Larry Yung MD [REFERRING] - 1-2 days


Time of Disposition: 12:34

## 2022-07-11 NOTE — XR
EXAMINATION TYPE: XR lumbar spine 2 or 3V

 

DATE OF EXAM: 7/11/2022 12:10 PM

 

INDICATION: 

Patient age:Female;  27 years old; 

Reason for study: pain; PHH. 

 

COMPARISON: CT abdomen and pelvis 7/17/2021

 

TECHNIQUE: Frontal, lateral and coned in L5-S1 lateral views of the spine.

 

FINDINGS: No evidence of any acute osseous pathology. There are 5 nonrib-bearing lumbar vertebral bod
ies. No evidence of loss of vertebral body height is seen. There is normal alignment of the lumbar ve
rtebral bodies. 

 

IMPRESSION: 

No acute process.

## 2022-12-06 ENCOUNTER — HOSPITAL ENCOUNTER (EMERGENCY)
Dept: HOSPITAL 47 - EC | Age: 27
Discharge: LEFT BEFORE DISCHARGE | End: 2022-12-06
Payer: COMMERCIAL

## 2022-12-06 VITALS
DIASTOLIC BLOOD PRESSURE: 57 MMHG | RESPIRATION RATE: 20 BRPM | HEART RATE: 104 BPM | TEMPERATURE: 98 F | SYSTOLIC BLOOD PRESSURE: 126 MMHG

## 2022-12-06 DIAGNOSIS — Z53.21: Primary | ICD-10-CM

## 2022-12-06 LAB
ALBUMIN SERPL-MCNC: 4.4 G/DL (ref 3.5–5)
ALP SERPL-CCNC: 140 U/L (ref 38–126)
ALT SERPL-CCNC: 61 U/L (ref 4–34)
ANION GAP SERPL CALC-SCNC: 10 MMOL/L
AST SERPL-CCNC: 55 U/L (ref 14–36)
BASOPHILS # BLD AUTO: 0.1 K/UL (ref 0–0.2)
BASOPHILS NFR BLD AUTO: 1 %
BUN SERPL-SCNC: 4 MG/DL (ref 7–17)
CALCIUM SPEC-MCNC: 8.9 MG/DL (ref 8.4–10.2)
CHLORIDE SERPL-SCNC: 105 MMOL/L (ref 98–107)
CO2 SERPL-SCNC: 22 MMOL/L (ref 22–30)
EOSINOPHIL # BLD AUTO: 0.1 K/UL (ref 0–0.7)
EOSINOPHIL NFR BLD AUTO: 1 %
ERYTHROCYTE [DISTWIDTH] IN BLOOD BY AUTOMATED COUNT: 4.29 M/UL (ref 3.8–5.4)
ERYTHROCYTE [DISTWIDTH] IN BLOOD: 12.4 % (ref 11.5–15.5)
GLUCOSE SERPL-MCNC: 112 MG/DL (ref 74–99)
HCT VFR BLD AUTO: 38.1 % (ref 34–46)
HGB BLD-MCNC: 13 GM/DL (ref 11.4–16)
LYMPHOCYTES # SPEC AUTO: 2 K/UL (ref 1–4.8)
LYMPHOCYTES NFR SPEC AUTO: 19 %
MCH RBC QN AUTO: 30.3 PG (ref 25–35)
MCHC RBC AUTO-ENTMCNC: 34.1 G/DL (ref 31–37)
MCV RBC AUTO: 88.7 FL (ref 80–100)
MONOCYTES # BLD AUTO: 0.6 K/UL (ref 0–1)
MONOCYTES NFR BLD AUTO: 6 %
NEUTROPHILS # BLD AUTO: 7.2 K/UL (ref 1.3–7.7)
NEUTROPHILS NFR BLD AUTO: 71 %
PLATELET # BLD AUTO: 301 K/UL (ref 150–450)
POTASSIUM SERPL-SCNC: 4 MMOL/L (ref 3.5–5.1)
PROT SERPL-MCNC: 7.2 G/DL (ref 6.3–8.2)
SODIUM SERPL-SCNC: 137 MMOL/L (ref 137–145)
WBC # BLD AUTO: 10.1 K/UL (ref 3.8–10.6)

## 2022-12-06 PROCEDURE — 36415 COLL VENOUS BLD VENIPUNCTURE: CPT

## 2022-12-06 PROCEDURE — 80053 COMPREHEN METABOLIC PANEL: CPT

## 2022-12-06 PROCEDURE — 99499 UNLISTED E&M SERVICE: CPT

## 2022-12-06 PROCEDURE — 85025 COMPLETE CBC W/AUTO DIFF WBC: CPT

## 2022-12-06 PROCEDURE — 84702 CHORIONIC GONADOTROPIN TEST: CPT

## 2022-12-20 ENCOUNTER — HOSPITAL ENCOUNTER (EMERGENCY)
Dept: HOSPITAL 47 - EC | Age: 27
Discharge: HOME | End: 2022-12-20
Payer: COMMERCIAL

## 2022-12-20 VITALS — DIASTOLIC BLOOD PRESSURE: 81 MMHG | HEART RATE: 88 BPM | SYSTOLIC BLOOD PRESSURE: 119 MMHG

## 2022-12-20 VITALS — RESPIRATION RATE: 16 BRPM | TEMPERATURE: 98 F

## 2022-12-20 DIAGNOSIS — Z88.1: ICD-10-CM

## 2022-12-20 DIAGNOSIS — Z88.0: ICD-10-CM

## 2022-12-20 DIAGNOSIS — Z3A.09: ICD-10-CM

## 2022-12-20 DIAGNOSIS — Z88.8: ICD-10-CM

## 2022-12-20 DIAGNOSIS — F17.290: ICD-10-CM

## 2022-12-20 DIAGNOSIS — R10.13: ICD-10-CM

## 2022-12-20 DIAGNOSIS — F41.9: ICD-10-CM

## 2022-12-20 DIAGNOSIS — F31.9: ICD-10-CM

## 2022-12-20 DIAGNOSIS — O99.341: ICD-10-CM

## 2022-12-20 DIAGNOSIS — Z88.2: ICD-10-CM

## 2022-12-20 DIAGNOSIS — O26.891: Primary | ICD-10-CM

## 2022-12-20 DIAGNOSIS — O99.331: ICD-10-CM

## 2022-12-20 LAB
PH UR: 6.5 [PH] (ref 5–8)
RBC UR QL: 2 /HPF (ref 0–5)
SP GR UR: 1.03 (ref 1–1.03)
SQUAMOUS UR QL AUTO: 19 /HPF (ref 0–4)
UROBILINOGEN UR QL STRIP: <2 MG/DL (ref ?–2)
WBC # UR AUTO: 4 /HPF (ref 0–5)

## 2022-12-20 PROCEDURE — 76801 OB US < 14 WKS SINGLE FETUS: CPT

## 2022-12-20 PROCEDURE — 81001 URINALYSIS AUTO W/SCOPE: CPT

## 2022-12-20 PROCEDURE — 99284 EMERGENCY DEPT VISIT MOD MDM: CPT

## 2022-12-20 PROCEDURE — 81025 URINE PREGNANCY TEST: CPT

## 2022-12-20 NOTE — US
EXAMINATION TYPE: Transabdominal

 

DATE OF EXAM: 2022 5:27 PM

 

COMPARISON: NONE

 

CLINICAL HISTORY: abd pain 6 weeks preg. Epigastric pain. . Positive beta hCG test.

 

EXAM PERFORMED:  Transabdominal (TA)

 

EXAM MEASUREMENTS:

 

GESTATIONAL AGE / DATING

 

Physician Established: Not yet established 

Dates by LMP:  10/20/22 (8 weeks/5 days)  

** EDC: 23

Dates by First Scan:  No previous this is first scan 

Dates by Current Scan for: (8 weeks/5 days)  

** EDC: 23

 

MATERNAL ANATOMY 

 

Uterus: 10.1 x 8.5 x 6.6cm. Fibroid seen measuring 3.0 x 3.2 x 2.2cm

Right Ovary: 1.8 x 2.0 x 1.2cm

Left Ovary: 2.7 x 2.3 x 2.0cm

Post CDS / Adnexa: wnl

Presence of free fluid: No

Presence of corpus luteal cyst: Possibly one in the left ovary

Presence of subchorionic bleed: No

 

GESTATION / FETAL SURVEY

 

CRL: 2.1cm (8 weeks/5 days)

Yolk Sac (normal less than 6mm): 3.6mm

Heart Rate: 177 bpm

Rhythm:  Normal

IUP:  Viable IUP

 

Date of LMP: 10/22/22

Beta HcG (if available):  HcG was 87,891 on 22. Labwork for today is pending

 

Viable IUP visualized.

 

Single live intrauterine gestation as gestational sac, yolk sac, and fetal pole are seen. No free flu
id in pelvic cul-de-sac.

 

Both ovaries are seen. No suspicious extra ovarian adnexal masses.

 

IMPRESSION: Single live intrauterine gestation is confirmed. Mean crown-rump length is 2.1 cm corresp
onding to 8 week 5 day old fetus.

## 2022-12-20 NOTE — ED
Abdominal Pain HPI





- General


Chief Complaint: Abdominal Pain


Stated Complaint: 9 wks preg, upper abd pain


Time Seen by Provider: 22 16:28


Source: patient, RN notes reviewed, old records reviewed


Mode of arrival: ambulatory


Limitations: no limitations





- History of Present Illness


Initial Comments: 





This is a well-appearing 27-year-old female that presents to the emergency room 

with epigastric abdominal pain since Saturday after eating steak and potatoes.


She states it feels like she was "punched in her solar plexus ". Denies any 

vomiting diarrhea or fevers. She states she tried Maalox and Mylanta with no 

improvement.  


She tried doing bicycling exercises with no relief.  She denies vaginal bleeding

or pelvic cramping.  She called Dr. Everett who recommended she come to the 

emergency room for evaluation.  She is a , has had 4 miscarriages.





MD Complaint: abdominal pain


-: days(s) (4)


Location: epigastric


Radiation: none


Severity scale (1-10): 4


Quality: sharp


Consistency: constant


Improves With: nothing


Worsens With: nothing


Associated Symptoms: denies other symptoms


Treatments Prior to Arrival: antacids





- Related Data


Patient Pregnant: Yes


Number of weeks pregnant: 6


                                Home Medications











 Medication  Instructions  Recorded  Confirmed


 


Albuterol Sulfate [Ventolin HFA] 1 - 2 puff INHALATION RT-Q6H PRN 10/12/19 

10/12/19


 


Dextroamphetamine/Amphetamine 30 mg PO BID 10/12/19 10/12/19





[Adderall]   


 


Loratadine [Claritin] 10 mg PO DAILY 10/12/19 10/12/19


 


Vienva 0.1/0.2mg 1 tab PO DAILY 10/12/19 10/12/19








                                  Previous Rx's











 Medication  Instructions  Recorded


 


Triamcinolone 0.1% Cream [Kenalog 1 applicatio TOPICAL BID #15 gram 20





0.1% Cream]  


 


clindamycin HCL [Clindamycin HCl] 300 mg PO Q6HR #40 cap 20


 


Mupirocin 2% Oint [Bactroban 2% 1 applic TOPICAL TID 5 Days #15 gm 20





Oint]  


 


lidocaine HCL [Lidocaine HCl 5 ml MM Q6H PRN #50 ml 20





Viscous]  


 


valACYclovir HCL [Valtrex] 1,000 mg PO BID 1 Days #4 tab 20


 


HYDROcodone/APAP 5-325MG [Norco 5] 1 each PO Q6HR PRN #12 tab 21


 


Amoxicillin 875 mg PO Q12HR #14 tablet 22


 


Cyclobenzaprine [Flexeril] 5 mg PO HS 3 Days #3 tab 22


 


Ibuprofen [Motrin] 600 mg PO Q8HR PRN #24 tab 22


 


Famotidine [Pepcid] 20 mg PO BID #28 tablet 22











                                    Allergies











Allergy/AdvReac Type Severity Reaction Status Date / Time


 


azithromycin Allergy  Rash/Hives Verified 22 11:12


 


methylphenidate HCl Allergy  Unknown Verified 22 11:12





[From Metadate CD]     


 


sulfamethoxazole Allergy  Rash/Hives Verified 22 11:12





[From Bactrim]     


 


trimethoprim [From Bactrim] Allergy  Rash/Hives Verified 22 11:12


 


cephalexin [From Keflex] AdvReac  Nausea & Verified 22 11:12





   Vomiting  














Review of Systems


ROS Statement: 


Those systems with pertinent positive or pertinent negative responses have been 

documented in the HPI.





ROS Other: All systems not noted in ROS Statement are negative.





Past Medical History


Past Medical History: No Reported History


Additional Past Medical History / Comment(s): hypoglycemia, elevated liver 

enzymes


History of Any Multi-Drug Resistant Organisms: None Reported


Past Surgical History: Tonsillectomy


Additional Past Surgical History / Comment(s): oral surgery


Past Anesthesia/Blood Transfusion Reactions: No Reported Reaction


Past Psychological History: Anxiety, Bipolar, Depression


Smoking Status: Current every day smoker, Vaper


Past Alcohol Use History: Occasional


Past Drug Use History: Marijuana





- Past Family History


  ** Mother


Family Medical History: Asthma, Thyroid Disorder





General Exam


Limitations: no limitations


General appearance: alert, in no apparent distress


Head exam: Present: atraumatic


Eye exam: Absent: scleral icterus, conjunctival injection


Respiratory exam: Absent: respiratory distress, accessory muscle use


Cardiovascular Exam: Present: regular rate


GI/Abdominal exam: Present: soft, tenderness (Epigastric).  Absent: distended, 

guarding, rebound, rigid


Extremities exam: Present: full ROM, normal capillary refill.  Absent: 

tenderness, pedal edema, joint swelling, calf tenderness


Neurological exam: Present: alert, oriented X3


Psychiatric exam: Present: normal affect, normal mood


Skin exam: Present: warm, dry, normal color.  Absent: cyanosis, diaphoretic, 

petechiae, pallor





Course


                                   Vital Signs











  22





  16:15 18:19


 


Temperature 98 F 


 


Pulse Rate 82 88


 


Respiratory 16 16





Rate  


 


Blood Pressure 122/73 119/81


 


O2 Sat by Pulse 98 100





Oximetry  














Medical Decision Making





- Medical Decision Making


Patient presents with epigastric abdominal pain since Saturday after eating 

steak and potatoes. States feels like gas. 


Describes pain as feeling like she was "punched in her solar plexus ". Denies 

any vomiting diarrhea or fevers.  


She denies vaginal bleeding or pelvic cramping.  She called Dr. Everett who 

recommended she come to the emergency room for evaluation.  She is a , has 

had 4 spontaneous miscarriages.





Urinalysis shows cloudy urine with small leukocyte esterase, squamous cells and 

occasional bacteria.  Due to patient's multiple ALLERGIES to antibiotics and did

discuss treatment with Dr. Argueta who recommended urine be sent for culture and 

holding treatment at this time.  





Ultrasound interpreted by me shows an intrauterine pregnancy.


Radiologist's interpretation single live intrauterine gestation with a crown-

rump length of 2.1 cm corresponding to 8 week 5 day old fetus, heart rate 177.





This is likely gastritis.


She was instructed to take Pepcid twice a day as prescribed.  She was also 

directed to take Maalox or Tums as needed along with her Zofran for any nausea. 

Increase her fluid intake.


Return to the emergency room with any new or concerning symptoms.    Case 

discussed with Dr. Argueta





- Lab Data


                                   Lab Results











  22 Range/Units





  17:02 17:02 


 


Urine Color  Yellow   


 


Urine Appearance  Cloudy H   (Clear)  


 


Urine pH  6.5   (5.0-8.0)  


 


Ur Specific Gravity  1.026   (1.001-1.035)  


 


Urine Protein  Trace H   (Negative)  


 


Urine Glucose (UA)  Negative   (Negative)  


 


Urine Ketones  Negative   (Negative)  


 


Urine Blood  Negative   (Negative)  


 


Urine Nitrite  Negative   (Negative)  


 


Urine Bilirubin  Negative   (Negative)  


 


Urine Urobilinogen  <2.0   (<2.0)  mg/dL


 


Ur Leukocyte Esterase  Small H   (Negative)  


 


Urine RBC  2   (0-5)  /hpf


 


Urine WBC  4   (0-5)  /hpf


 


Ur Squamous Epith Cells  19 H   (0-4)  /hpf


 


Amorphous Sediment  Few H   (None)  /hpf


 


Urine Bacteria  Occasional H   (None)  /hpf


 


Urine Mucus  Many H   (None)  /hpf


 


Urine HCG, Qual   Detected  (Not Detectd)  














Disposition


Clinical Impression: 


 Abdominal pain, Pregnancy





Disposition: HOME SELF-CARE


Condition: Good


Instructions (If sedation given, give patient instructions):  Abdominal Pain in 

Pregnancy (ED)


Additional Instructions: 


Continue taking Tylenol as needed for any pain or discomfort.  You can also take

Pepcid 20 mg twice a day in addition to your Zofran and Maalox.  





Follow-up with your primary care doctor and OB/GYN within the next week.  Return

to the emergency room with any new or concerning symptoms.


Prescriptions: 


Famotidine [Pepcid] 20 mg PO BID #28 tablet


Is patient prescribed a controlled substance at d/c from ED?: No


Referrals: 


None,Stated [Primary Care Provider] - 1-2 days


Time of Disposition: 18:01

## 2023-01-21 ENCOUNTER — HOSPITAL ENCOUNTER (EMERGENCY)
Dept: HOSPITAL 47 - EC | Age: 28
Discharge: HOME | End: 2023-01-21
Payer: COMMERCIAL

## 2023-01-21 VITALS
TEMPERATURE: 97.8 F | RESPIRATION RATE: 16 BRPM | DIASTOLIC BLOOD PRESSURE: 71 MMHG | HEART RATE: 71 BPM | SYSTOLIC BLOOD PRESSURE: 110 MMHG

## 2023-01-21 DIAGNOSIS — Z88.2: ICD-10-CM

## 2023-01-21 DIAGNOSIS — F12.90: ICD-10-CM

## 2023-01-21 DIAGNOSIS — O99.341: ICD-10-CM

## 2023-01-21 DIAGNOSIS — Z88.8: ICD-10-CM

## 2023-01-21 DIAGNOSIS — Z79.899: ICD-10-CM

## 2023-01-21 DIAGNOSIS — F17.290: ICD-10-CM

## 2023-01-21 DIAGNOSIS — O26.891: Primary | ICD-10-CM

## 2023-01-21 DIAGNOSIS — F31.9: ICD-10-CM

## 2023-01-21 DIAGNOSIS — O99.331: ICD-10-CM

## 2023-01-21 DIAGNOSIS — F41.9: ICD-10-CM

## 2023-01-21 DIAGNOSIS — Z3A.13: ICD-10-CM

## 2023-01-21 DIAGNOSIS — R10.9: ICD-10-CM

## 2023-01-21 DIAGNOSIS — Z88.1: ICD-10-CM

## 2023-01-21 LAB
ALBUMIN SERPL-MCNC: 3.9 G/DL (ref 3.5–5)
ALP SERPL-CCNC: 57 U/L (ref 38–126)
ALT SERPL-CCNC: 33 U/L (ref 4–34)
ANION GAP SERPL CALC-SCNC: 6 MMOL/L
AST SERPL-CCNC: 28 U/L (ref 14–36)
BASOPHILS # BLD AUTO: 0.1 K/UL (ref 0–0.2)
BASOPHILS NFR BLD AUTO: 1 %
BUN SERPL-SCNC: 12 MG/DL (ref 7–17)
CALCIUM SPEC-MCNC: 8.9 MG/DL (ref 8.4–10.2)
CHLORIDE SERPL-SCNC: 105 MMOL/L (ref 98–107)
CO2 SERPL-SCNC: 26 MMOL/L (ref 22–30)
EOSINOPHIL # BLD AUTO: 0.3 K/UL (ref 0–0.7)
EOSINOPHIL NFR BLD AUTO: 3 %
ERYTHROCYTE [DISTWIDTH] IN BLOOD BY AUTOMATED COUNT: 4.19 M/UL (ref 3.8–5.4)
ERYTHROCYTE [DISTWIDTH] IN BLOOD: 13 % (ref 11.5–15.5)
GLUCOSE SERPL-MCNC: 85 MG/DL (ref 74–99)
HCT VFR BLD AUTO: 36.8 % (ref 34–46)
HGB BLD-MCNC: 12.6 GM/DL (ref 11.4–16)
LIPASE SERPL-CCNC: 50 U/L (ref 23–300)
LYMPHOCYTES # SPEC AUTO: 1.9 K/UL (ref 1–4.8)
LYMPHOCYTES NFR SPEC AUTO: 22 %
MCH RBC QN AUTO: 30.2 PG (ref 25–35)
MCHC RBC AUTO-ENTMCNC: 34.4 G/DL (ref 31–37)
MCV RBC AUTO: 87.8 FL (ref 80–100)
MONOCYTES # BLD AUTO: 0.4 K/UL (ref 0–1)
MONOCYTES NFR BLD AUTO: 5 %
NEUTROPHILS # BLD AUTO: 5.9 K/UL (ref 1.3–7.7)
NEUTROPHILS NFR BLD AUTO: 67 %
PH UR: 8 [PH] (ref 5–8)
PLATELET # BLD AUTO: 218 K/UL (ref 150–450)
POTASSIUM SERPL-SCNC: 4.1 MMOL/L (ref 3.5–5.1)
PROT SERPL-MCNC: 6.6 G/DL (ref 6.3–8.2)
RBC UR QL: 3 /HPF (ref 0–5)
SODIUM SERPL-SCNC: 137 MMOL/L (ref 137–145)
SP GR UR: 1.02 (ref 1–1.03)
SQUAMOUS UR QL AUTO: 15 /HPF (ref 0–4)
UROBILINOGEN UR QL STRIP: <2 MG/DL (ref ?–2)
WBC # BLD AUTO: 8.8 K/UL (ref 3.8–10.6)
WBC # UR AUTO: 4 /HPF (ref 0–5)

## 2023-01-21 PROCEDURE — 81001 URINALYSIS AUTO W/SCOPE: CPT

## 2023-01-21 PROCEDURE — 85025 COMPLETE CBC W/AUTO DIFF WBC: CPT

## 2023-01-21 PROCEDURE — 76705 ECHO EXAM OF ABDOMEN: CPT

## 2023-01-21 PROCEDURE — 80053 COMPREHEN METABOLIC PANEL: CPT

## 2023-01-21 PROCEDURE — 36415 COLL VENOUS BLD VENIPUNCTURE: CPT

## 2023-01-21 PROCEDURE — 76801 OB US < 14 WKS SINGLE FETUS: CPT

## 2023-01-21 PROCEDURE — 99284 EMERGENCY DEPT VISIT MOD MDM: CPT

## 2023-01-21 PROCEDURE — 83690 ASSAY OF LIPASE: CPT

## 2023-01-21 NOTE — US
EXAMINATION TYPE: US abdomen APPY

 

DATE OF EXAM: 1/21/2023

 

COMPARISON: NONE

 

CLINICAL HISTORY: rlq pain.

 

TECHNIQUE: Multiple sonographic images of the right lower quadrant were obtained with graded compress
ion.

 

FINDINGS:

 

APPENDIX

AP Diameter (normal < 6mm):  4.0 mm

     Measured outer wall to outer wall.

 

Is the appendix seen in its entirety from the proximal cecum to distal end:  No 

Is the appendix compressible:  Yes 

Does the appendix wall appear hypervascular:  No 

Is an appendicolith present:  No 

Is there inflammatory changes or free fluid present:  No 

 

SONOGRAPHER NOTES: Limited visualization of partial appendix.  Overlying bowel gas.  Patient is 13 we
eks pregnant.

 

 

 

IMPRESSION:  

Partial visualization of the appendix. No evidence of appendicitis.

## 2023-01-21 NOTE — ED
Abdominal Pain HPI





- General


Chief Complaint: Abdominal Pain


Stated Complaint: Abd Pain,13wks Pregnant


Time Seen by Provider: 01/21/23 18:00


Source: patient, old records reviewed


Mode of arrival: ambulatory





- History of Present Illness


Initial Comments: 





27 year old female presents emergency Department with lower abdominal pain.  She

is 13 weeks pregnant.  States that today she developed a sharp shooting pain in 

her right lower quadrant denies any provocative factors.  She did take 2 Tylenol

at home without any improvement in her symptoms.  Denies any vaginal bleeding or

discharge.  No dysuria, hematuria or difficulty voiding.  No obstipation, 

diarrhea, black or bloody stools.  No fevers.  Follows with Dr. Everett.  No 

other alleviating, precipitating or modifying factors. 





- Related Data


                                Home Medications











 Medication  Instructions  Recorded  Confirmed


 


Albuterol Sulfate [Ventolin HFA] 1 - 2 puff INHALATION RT-Q6H PRN 10/12/19 

10/12/19


 


Dextroamphetamine/Amphetamine 30 mg PO BID 10/12/19 10/12/19





[Adderall]   


 


Loratadine [Claritin] 10 mg PO DAILY 10/12/19 10/12/19


 


Vienva 0.1/0.2mg 1 tab PO DAILY 10/12/19 10/12/19








                                  Previous Rx's











 Medication  Instructions  Recorded


 


Triamcinolone 0.1% Cream [Kenalog 1 applicatio TOPICAL BID #15 gram 08/18/20





0.1% Cream]  


 


clindamycin HCL [Clindamycin HCl] 300 mg PO Q6HR #40 cap 08/18/20


 


Mupirocin 2% Oint [Bactroban 2% 1 applic TOPICAL TID 5 Days #15 gm 11/08/20





Oint]  


 


lidocaine HCL [Lidocaine HCl 5 ml MM Q6H PRN #50 ml 11/08/20





Viscous]  


 


valACYclovir HCL [Valtrex] 1,000 mg PO BID 1 Days #4 tab 11/08/20


 


HYDROcodone/APAP 5-325MG [Norco 5] 1 each PO Q6HR PRN #12 tab 07/17/21


 


Amoxicillin 875 mg PO Q12HR #14 tablet 02/02/22


 


Cyclobenzaprine [Flexeril] 5 mg PO HS 3 Days #3 tab 07/11/22


 


Ibuprofen [Motrin] 600 mg PO Q8HR PRN #24 tab 07/11/22


 


Famotidine [Pepcid] 20 mg PO BID #28 tablet 12/20/22











                                    Allergies











Allergy/AdvReac Type Severity Reaction Status Date / Time


 


azithromycin Allergy  Rash/Hives Verified 01/21/23 17:56


 


methylphenidate HCl Allergy  Unknown Verified 01/21/23 17:56





[From Metadate CD]     


 


sulfamethoxazole Allergy  Rash/Hives Verified 01/21/23 17:56





[From Bactrim]     


 


trimethoprim [From Bactrim] Allergy  Rash/Hives Verified 01/21/23 17:56


 


cephalexin [From Keflex] AdvReac  Nausea & Verified 01/21/23 17:56





   Vomiting  














Review of Systems


ROS Statement: 


Those systems with pertinent positive or pertinent negative responses have been 

documented in the HPI.





ROS Other: All systems not noted in ROS Statement are negative.





Past Medical History


Past Medical History: No Reported History


Additional Past Medical History / Comment(s): hypoglycemia, elevated liver 

enzymes


History of Any Multi-Drug Resistant Organisms: None Reported


Past Surgical History: Tonsillectomy


Additional Past Surgical History / Comment(s): oral surgery


Past Anesthesia/Blood Transfusion Reactions: No Reported Reaction


Past Psychological History: Anxiety, Bipolar, Depression


Smoking Status: Current every day smoker, Vaper


Past Alcohol Use History: Occasional


Past Drug Use History: Marijuana





- Past Family History


  ** Mother


Family Medical History: Asthma, Thyroid Disorder





General Exam


General appearance: alert, in no apparent distress


Head exam: Present: atraumatic, normocephalic, normal inspection


Eye exam: Present: normal appearance, PERRL, EOMI.  Absent: scleral icterus, 

conjunctival injection, periorbital swelling


ENT exam: Present: normal exam, mucous membranes moist


Neck exam: Present: normal inspection.  Absent: tenderness, meningismus, 

lymphadenopathy


Respiratory exam: Present: normal lung sounds bilaterally.  Absent: respiratory 

distress, wheezes, rales, rhonchi, stridor


Cardiovascular Exam: Present: regular rate, normal rhythm, normal heart sounds. 

Absent: systolic murmur, diastolic murmur, rubs, gallop, clicks


GI/Abdominal exam: Present: soft, normal bowel sounds.  Absent: distended, 

tenderness, guarding, rebound, rigid


Extremities exam: Present: normal inspection, full ROM, normal capillary refill.

 Absent: tenderness, pedal edema, joint swelling, calf tenderness


Back exam: Present: normal inspection


Neurological exam: Present: alert, oriented X3, CN II-XII intact


Psychiatric exam: Present: normal affect, normal mood


Skin exam: Present: warm, dry, intact, normal color.  Absent: rash





Course


                                   Vital Signs











  01/21/23





  17:53


 


Temperature 97.8 F


 


Pulse Rate 71


 


Respiratory 16





Rate 


 


Blood Pressure 110/71


 


O2 Sat by Pulse 97





Oximetry 














Medical Decision Making





- Medical Decision Making


Was pt. sent in by a medical professional or institution?


no


Did you speak to anyone other than the patient for history?  


no


Did you review nursing and triage notes? 


yes and I agree


Were old charts reviewed? 


no


Differential Diagnosis? 


MDM Differential Syncope:


ectopic pregnancy, miscarriage, ovarian cyst, appendicitis, uterine abruption, 

torsion, sbo


EKG interpreted by me (3pts min.)?


no


X-rays interpreted by me (1pt min.)?


no


CT interpreted by me (1pt min.)?


no


U/S interpreted by me (1pt. min.)?


yes


What testing was considered but not performed? (CT, X-rays, U/S, labs)? Why?


none


What meds were considered but not given? Why?


none


Did you discuss the management of the patient with other professionals?


no


Did you reconcile home meds?


no


Was smoking cessation discussed for >3mins.?


no


Was critical care preformed (if so, how long)?


no


Were there social determinants of health that impacted care today? How? 

(Homelessness, low income, unemployed, alcoholism, drug addiction, t

ransportation, low edu. Level, literacy, decrease access to med. care, care home, 

rehab)?


none


Was there de-escalation of care discussed even if they declined? (Discuss DNR or

withdrawal of care, Hospice)?


no


What co-morbidities impacted this encounter? (DM, HTN, Smoking, COPD, CAD, 

Cancer, CVA, Hep., AIDS, mental health diagnosis, sleep apnea, morbid obesity)?


none


Was patient admitted / discharged?


Upon arrival patient was placed into room 7.  Thorough history and physical exam

was performed.  We did discuss pain control.  Patient was requesting Tylenol 3. 

Informed of the risks of providing medication that has opiate properties in 

pregnancy.  Patient does accept these risks requesting medication at this time. 

I is established laboratory studies are conducted.  Ultrasound of the right 

lower quadrant and pregnancy or performed.  Ultrasound demonstrates intrauterine

pregnancy with heart rate of 167.  No complicating process.  Appendix is 

visualized and normal.  Results are discussed with the patient.  She'll be 

discharged home at this time.  Instructed to take Tylenol as needed for pain 

control as her pain is now improved.  Call and make an appointment with her 

OB/GYN on Monday.  If she has any new or worsening symptoms to include 

uncontrolled pain, fevers, vaginal bleeding she needs to return to the emergency

room.  Patient agreeable with plan she was discharged home in stable condition


Undiagnosed new problem with uncertain prognosis?


yes


Drug Therapy requiring intensive monitoring for toxicity (Heparin, Nitro, 

Insulin, Cardizem)?


no


Were any procedures done?


no


Diagnosis/symptom?


acute pelvic pain, second trimester pregnancy


Acute, or Chronic, or Acute on Chronic?


acute


Uncomplicated (without systemic symptoms) or Complicated (systemic symptoms)?


uncomplicated


Side effects of treatment?


patient warned that tylenol #3 causing sedation in fetus and is not recommended 

as best treatment options for baby


Exacerbation, Progression, or Severe Exacerbation]


no


Poses a threat to life or bodily function?


no





- Lab Data


Result diagrams: 


                                 01/21/23 18:29





                                 01/21/23 18:29


                                   Lab Results











  01/21/23 01/21/23 01/21/23 Range/Units





  18:29 18:29 18:29 


 


WBC  8.8    (3.8-10.6)  k/uL


 


RBC  4.19    (3.80-5.40)  m/uL


 


Hgb  12.6    (11.4-16.0)  gm/dL


 


Hct  36.8    (34.0-46.0)  %


 


MCV  87.8    (80.0-100.0)  fL


 


MCH  30.2    (25.0-35.0)  pg


 


MCHC  34.4    (31.0-37.0)  g/dL


 


RDW  13.0    (11.5-15.5)  %


 


Plt Count  218    (150-450)  k/uL


 


MPV  9.2    


 


Neutrophils %  67    %


 


Lymphocytes %  22    %


 


Monocytes %  5    %


 


Eosinophils %  3    %


 


Basophils %  1    %


 


Neutrophils #  5.9    (1.3-7.7)  k/uL


 


Lymphocytes #  1.9    (1.0-4.8)  k/uL


 


Monocytes #  0.4    (0-1.0)  k/uL


 


Eosinophils #  0.3    (0-0.7)  k/uL


 


Basophils #  0.1    (0-0.2)  k/uL


 


Sodium    137  (137-145)  mmol/L


 


Potassium    4.1  (3.5-5.1)  mmol/L


 


Chloride    105  ()  mmol/L


 


Carbon Dioxide    26  (22-30)  mmol/L


 


Anion Gap    6  mmol/L


 


BUN    12  (7-17)  mg/dL


 


Creatinine    0.54  (0.52-1.04)  mg/dL


 


Est GFR (CKD-EPI)AfAm    >90  (>60 ml/min/1.73 sqM)  


 


Est GFR (CKD-EPI)NonAf    >90  (>60 ml/min/1.73 sqM)  


 


Glucose    85  (74-99)  mg/dL


 


Calcium    8.9  (8.4-10.2)  mg/dL


 


Total Bilirubin    0.3  (0.2-1.3)  mg/dL


 


AST    28  (14-36)  U/L


 


ALT    33  (4-34)  U/L


 


Alkaline Phosphatase    57  ()  U/L


 


Total Protein    6.6  (6.3-8.2)  g/dL


 


Albumin    3.9  (3.5-5.0)  g/dL


 


Lipase    50  ()  U/L


 


Urine Color   Light Yellow   


 


Urine Appearance   Turbid H   (Clear)  


 


Urine pH   8.0   (5.0-8.0)  


 


Ur Specific Gravity   1.019   (1.001-1.035)  


 


Urine Protein   Negative   (Negative)  


 


Urine Glucose (UA)   Negative   (Negative)  


 


Urine Ketones   Negative   (Negative)  


 


Urine Blood   Negative   (Negative)  


 


Urine Nitrite   Negative   (Negative)  


 


Urine Bilirubin   Negative   (Negative)  


 


Urine Urobilinogen   <2.0   (<2.0)  mg/dL


 


Ur Leukocyte Esterase   Large H   (Negative)  


 


Urine RBC   3   (0-5)  /hpf


 


Urine WBC   4   (0-5)  /hpf


 


Ur Squamous Epith Cells   15 H   (0-4)  /hpf


 


Amorphous Sediment   Rare H   (None)  /hpf


 


Urine Mucus   Rare H   (None)  /hpf














Disposition


Clinical Impression: 


 Abdominal pain, Second trimester pregnancy





Disposition: HOME SELF-CARE


Condition: Stable


Instructions (If sedation given, give patient instructions):  Abdominal Pain in 

Pregnancy (ED)


Additional Instructions: 


Take Tylenol for pain.  Monitor your symptoms.  Call your OB on Monday.  If your

symptoms worsen, please return to the emergency department


Is patient prescribed a controlled substance at d/c from ED?: No


Referrals: 


Guadalupe Everett DO [Doctor of Osteopathic Medicine] - 1-2 days


Time of Disposition: 20:31

## 2023-01-21 NOTE — US
EXAMINATION TYPE: Transabdominal

 

DATE OF EXAM: 1/21/2023 7:37 PM

 

COMPARISON: NONE

 

CLINICAL HISTORY: abd pain. Right lower quadrant pain.

 

EXAM PERFORMED:  Transabdominal (TA)

 

EXAM MEASUREMENTS:

 

GESTATIONAL AGE / DATING

 

Dates by LMP: (13 weeks/2 days)  

** EDC: 07/27/2023

Dates by First Scan:  No data available. 

Dates by Current Scan for: (13 weeks/1 days)  

** EDC: 07/28/2023

 

MATERNAL ANATOMY 

 

Uterus: wnl

Right Ovary: wnl

Left Ovary: Not visualized

Post CDS / Adnexa: wnl

Presence of free fluid: No

Presence of corpus luteal cyst: No

Presence of subchorionic bleed: No

 

GESTATION / FETAL SURVEY

 

CRL: 6.89cm (13 weeks/1 days)

MSD: wnl 

Yolk Sac (normal less than 6mm): No

Heart Rate: 167 bpm

Rhythm:  Normal

IUP:  Viable IUP

 

Date of LMP: 10/20/2022

Beta HcG (if available):  Unavailable

 

 

 

 

 

IMPRESSION:

 

The ultrasound gestational age is 13 weeks and 1 day. No complicating process seen.

## 2023-03-18 ENCOUNTER — HOSPITAL ENCOUNTER (OUTPATIENT)
Dept: HOSPITAL 47 - FBPOP | Age: 28
End: 2023-03-18
Attending: OBSTETRICS & GYNECOLOGY
Payer: COMMERCIAL

## 2023-03-18 VITALS
DIASTOLIC BLOOD PRESSURE: 55 MMHG | SYSTOLIC BLOOD PRESSURE: 113 MMHG | RESPIRATION RATE: 14 BRPM | TEMPERATURE: 98.5 F | HEART RATE: 77 BPM

## 2023-03-18 DIAGNOSIS — Z88.2: ICD-10-CM
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
## 2023-03-18 DIAGNOSIS — F17.210: ICD-10-CM

## 2023-03-18 DIAGNOSIS — O9A.212: Primary | ICD-10-CM
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
## 2023-03-18 DIAGNOSIS — R10.9: ICD-10-CM

## 2023-03-18 DIAGNOSIS — Z88.8: ICD-10-CM

## 2023-03-18 DIAGNOSIS — Z88.1: ICD-10-CM

## 2023-03-18 DIAGNOSIS — Z3A.21: ICD-10-CM

## 2023-03-18 DIAGNOSIS — O99.332: ICD-10-CM

## 2023-03-18 DIAGNOSIS — G89.11: ICD-10-CM

## 2023-03-18 PROCEDURE — 99213 OFFICE O/P EST LOW 20 MIN: CPT
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
## 2023-05-17 ENCOUNTER — HOSPITAL ENCOUNTER (OUTPATIENT)
Dept: HOSPITAL 47 - FBPOP | Age: 28
Discharge: HOME | End: 2023-05-17
Attending: OBSTETRICS & GYNECOLOGY
Payer: COMMERCIAL

## 2023-05-17 DIAGNOSIS — M54.50: ICD-10-CM

## 2023-05-17 DIAGNOSIS — Z88.2: ICD-10-CM

## 2023-05-17 DIAGNOSIS — F17.290: ICD-10-CM

## 2023-05-17 DIAGNOSIS — O99.333: ICD-10-CM

## 2023-05-17 DIAGNOSIS — O26.893: Primary | ICD-10-CM

## 2023-05-17 DIAGNOSIS — A74.9: ICD-10-CM

## 2023-05-17 DIAGNOSIS — R06.02: ICD-10-CM

## 2023-05-17 DIAGNOSIS — Z88.8: ICD-10-CM

## 2023-05-17 DIAGNOSIS — Z3A.30: ICD-10-CM

## 2023-05-17 DIAGNOSIS — O24.419: ICD-10-CM

## 2023-05-17 DIAGNOSIS — Z88.1: ICD-10-CM

## 2023-05-17 LAB
PH UR: 7 [PH] (ref 5–8)
RBC UR QL: 1 /HPF (ref 0–5)
SP GR UR: 1.01 (ref 1–1.03)
SQUAMOUS UR QL AUTO: 3 /HPF (ref 0–4)
UROBILINOGEN UR QL STRIP: <2 MG/DL (ref ?–2)
WBC #/AREA URNS HPF: 5 /HPF (ref 0–5)

## 2023-05-17 PROCEDURE — 59025 FETAL NON-STRESS TEST: CPT

## 2023-05-17 PROCEDURE — 81001 URINALYSIS AUTO W/SCOPE: CPT

## 2023-05-17 PROCEDURE — 99213 OFFICE O/P EST LOW 20 MIN: CPT

## 2023-05-18 VITALS
SYSTOLIC BLOOD PRESSURE: 127 MMHG | RESPIRATION RATE: 16 BRPM | HEART RATE: 90 BPM | DIASTOLIC BLOOD PRESSURE: 76 MMHG | TEMPERATURE: 97.1 F

## 2023-05-18 NOTE — P.MSEPDOC
Presenting Problems





- Arrival Data


Date of Arrival on Unit: 23


Time of Arrival on Unit: 21:49


Mode of Transport: Ambulatory





- Complaint


OB-Reason for Admission/Chief Complaint: Pain, NST


Comment: Patient presents to ER with right lower back pain, shortness of breath 

and.  weakness with exertion. , [facility] 2023, [place] 29.6. Feeling fetal 

movement, no.  leakage of fluid or bleeding. History includes GDM, Breech 

pregnancy. Social history.  includes vapeing.





Prenatal Medical History





- Pregnancy Information


: 4


Para: 1


Term: 1


: 0


Abortions: Spontaneous or Elective: 2


Number of Living Children: 1





- Gestational Age


Gestational Age by JAZLYN (wks/days): 30 Weeks and 0 Days





- Prenatal History


Pregnancy Complications: GDM, Breech, Smoker


Sexually Transmitted Diseases: Chlamydia





Review of Systems





- Review of Systems


Constitutional: No problems


Breast: No problems


ENT: No problems


Cardiovascular: No problems


Respiratory: No problems


Gastrointestinal: No problems


Genitourinary: No problems


Musculoskeletal: Muscle weakness


Neurological: No problems


Skin: No problems


Comment: Patient c/o right lower back pain, shortness of breath and weakness 

throughout the day.





Vital Signs





- Temperature


Temperature: 97.1 F


Temperature Source: Temporal Artery Scan





- Pulse


  ** Pulse Oximetery


Pulse Rate: 90


Pulse Assessment Method: Pulse Oximetry





- Respirations


Respiratory Rate: 16


Oxygen Delivery Method: Room Air


O2 Sat by Pulse Oximetry: 98





- Blood Pressure


  ** Left Arm


Blood Pressure: 127/76


Blood Pressure Mean: 93


Blood Pressure Source: Automatic Cuff





Medical Screen Scoring





- Uterine Contractions


Resting: Soft to palpation





- Fetal Assessment - Baby A


Baseline FHR: 135


Fetal Heart Rate - NICHD Category: Category I (Normal)


NST: Reactive





Physician Notification





- Physician Notified


Physician Notified Date: 23


Physician Notified Time: 22:51


Physician: Rodolfo Madrigal


New Order Received: Yes





- Notification Comment


Comment: Called and spoke with Dr. Madrigal to report on triage patient. Patient 

sees LYNNETTE Kim, [facility] 2023, [place] 29.6. Presents to triage with right 

lower back pain,.  shortness of breath and weakness throughout the day. No 

leakage of fluid or bleeding,.  baby is reactive on NST and patient is feeling 

baby move. Orders for UA, discharge if no.  significant results.





Maternal Fetal Triage Index





- Maternal Fetal Triage Index


Presenting for scheduled procedure w/no complaint: No





- Stat/Priority 1


Stat Priority 1: No





- Urgent/Priority 2


Urgent Priority 2: No





- Prompt/Priority 3


Prompt Priority 3: No





- Non-Urgent/Priority 4


Non-Urgent Priority 4: Yes


Criteria Met for Priority 4: Common discomforts of pregnancy, right lower back 

pain.





Disposition





- Disposition


OB Disposition: Discharge to home


Discharge Date: 23


Discharge Time: 23:18


I agree with the RN Medical Screening Exam: Yes


Case reviewed; plan agreed upon as documented in EMR&OBIX.: Yes


Diagnosis: PREGNANCY RELATED CONDITIONS, UNSPECIFIED, THIRD TRIMESTER

## 2023-05-24 ENCOUNTER — HOSPITAL ENCOUNTER (OUTPATIENT)
Dept: HOSPITAL 47 - FBPOP | Age: 28
End: 2023-05-24
Attending: OBSTETRICS & GYNECOLOGY
Payer: COMMERCIAL

## 2023-05-24 VITALS
SYSTOLIC BLOOD PRESSURE: 120 MMHG | RESPIRATION RATE: 16 BRPM | DIASTOLIC BLOOD PRESSURE: 69 MMHG | HEART RATE: 89 BPM | TEMPERATURE: 97.3 F

## 2023-05-24 DIAGNOSIS — Z88.2: ICD-10-CM

## 2023-05-24 DIAGNOSIS — O24.419: ICD-10-CM

## 2023-05-24 DIAGNOSIS — Z88.8: ICD-10-CM

## 2023-05-24 DIAGNOSIS — Z3A.30: ICD-10-CM

## 2023-05-24 DIAGNOSIS — O47.03: Primary | ICD-10-CM

## 2023-05-24 DIAGNOSIS — O99.333: ICD-10-CM

## 2023-05-24 DIAGNOSIS — F17.200: ICD-10-CM

## 2023-05-24 DIAGNOSIS — Z88.1: ICD-10-CM

## 2023-05-24 LAB — GLUCOSE BLD-MCNC: 112 MG/DL (ref 70–110)

## 2023-05-24 PROCEDURE — 84112 EVAL AMNIOTIC FLUID PROTEIN: CPT

## 2023-05-24 PROCEDURE — 99213 OFFICE O/P EST LOW 20 MIN: CPT

## 2023-05-24 PROCEDURE — 59025 FETAL NON-STRESS TEST: CPT

## 2023-05-31 ENCOUNTER — HOSPITAL ENCOUNTER (OUTPATIENT)
Dept: HOSPITAL 47 - FBPOP | Age: 28
Discharge: HOME | End: 2023-05-31
Attending: OBSTETRICS & GYNECOLOGY
Payer: COMMERCIAL

## 2023-05-31 VITALS
SYSTOLIC BLOOD PRESSURE: 119 MMHG | RESPIRATION RATE: 18 BRPM | TEMPERATURE: 97.3 F | DIASTOLIC BLOOD PRESSURE: 69 MMHG | HEART RATE: 89 BPM

## 2023-05-31 DIAGNOSIS — O24.419: ICD-10-CM

## 2023-05-31 DIAGNOSIS — Z88.8: ICD-10-CM

## 2023-05-31 DIAGNOSIS — Z3A.31: ICD-10-CM

## 2023-05-31 DIAGNOSIS — Z88.2: ICD-10-CM

## 2023-05-31 DIAGNOSIS — O99.333: ICD-10-CM

## 2023-05-31 DIAGNOSIS — O47.03: Primary | ICD-10-CM

## 2023-05-31 DIAGNOSIS — F17.200: ICD-10-CM

## 2023-05-31 DIAGNOSIS — Z88.1: ICD-10-CM

## 2023-05-31 LAB
GLUCOSE BLD-MCNC: 91 MG/DL (ref 70–110)
PH UR: 6.5 [PH] (ref 5–8)
PROT UR QL: (no result)
SP GR UR: 1.03 (ref 1–1.03)
SQUAMOUS UR QL AUTO: 40 /HPF (ref 0–4)
UROBILINOGEN UR QL STRIP: 2 MG/DL (ref ?–2)
WBC #/AREA URNS HPF: 53 /HPF (ref 0–5)

## 2023-05-31 PROCEDURE — 99213 OFFICE O/P EST LOW 20 MIN: CPT

## 2023-05-31 PROCEDURE — 81001 URINALYSIS AUTO W/SCOPE: CPT

## 2023-05-31 PROCEDURE — 59025 FETAL NON-STRESS TEST: CPT

## 2023-05-31 PROCEDURE — 87086 URINE CULTURE/COLONY COUNT: CPT

## 2023-06-02 NOTE — P.MSEPDOC
Presenting Problems





- Arrival Data


Date of Arrival on Unit: 23


Time of Arrival on Unit: 11:11


Mode of Transport: Ambulatory





- Complaint


OB-Reason for Admission/Chief Complaint: Pain


Comment: cramping that started at 0930 this morning. intercourse last night at 

6pm. pt rates 4/10 cramps





Prenatal Medical History





- Pregnancy Information


: 4


Para: 1


Term: 1


: 0


Abortions: Spontaneous or Elective: 2


Number of Living Children: 1





- Gestational Age


Gestational Age by JAZLYN (wks/days): 31 Weeks and 6 Days





- Prenatal History


Pregnancy Complications: GDM





Review of Systems





- Review of Systems


Constitutional: No problems


Breast: No problems


ENT: No problems


Cardiovascular: No problems


Respiratory: No problems


Gastrointestinal: No problems


Genitourinary: No problems


Musculoskeletal: No problems


Neurological: No problems


Skin: No problems





Vital Signs





- Temperature


Temperature: 97.3 F


Temperature Source: Temporal Artery Scan





- Pulse


  ** Right Pulse Oximetery


Pulse Rate: 89


Pulse Assessment Method: Pulse Oximetry





- Respirations


Respiratory Rate: 18


Oxygen Delivery Method: Room Air


O2 Sat by Pulse Oximetry: 98





- Blood Pressure


  ** Right Arm


Blood Pressure: 119/69


Blood Pressure Mean: 85


Blood Pressure Source: Automatic Cuff





Medical Screen Scoring





- Uterine Contractions


Frequency From (mins): 9


Frequency To (mins): 12


Duration From (seconds): 30


Duration To (seconds): 40


Intensity: Mild


Resting: Soft to palpation





- Fetal Assessment - Baby A


Baseline FHR: 130


Fetal Heart Rate - NICHD Category: Category I (Normal)


NST: Reactive





Physician Notification





- Physician Notified


Physician Notified Date: 23


Physician Notified Time: 12:05


Physician: Rodolfo Madrigal


New Order Received: Yes (culture urine, check cervix.)





Maternal Fetal Triage Index





- Maternal Fetal Triage Index


Presenting for scheduled procedure w/no complaint: No





- Stat/Priority 1


Stat Priority 1: No





- Urgent/Priority 2


Urgent Priority 2: Yes


Provider Notified: Rodolfo Madrigal


Provider Notified Time: 12:05


Criteria Met for Priority 2: contractions, 31 6/7 weeks gestation





Disposition





- Disposition


OB Disposition: Discharge to home


Discharge Date: 23


Discharge Time: 12:20


I agree with the RN Medical Screening Exam: Yes


Case reviewed; plan agreed upon as documented in EMR&OBIX.: Yes


Diagnosis: FALSE LABOR BEFORE 37 COMPLETED WEEKS OF GEST, THIRD TRI

## 2023-06-04 ENCOUNTER — HOSPITAL ENCOUNTER (OUTPATIENT)
Dept: HOSPITAL 47 - FBPOP | Age: 28
LOS: 1 days | Discharge: HOME | End: 2023-06-05
Attending: OBSTETRICS & GYNECOLOGY
Payer: COMMERCIAL

## 2023-06-04 DIAGNOSIS — O99.333: Primary | ICD-10-CM

## 2023-06-04 DIAGNOSIS — Z88.2: ICD-10-CM

## 2023-06-04 DIAGNOSIS — Z88.8: ICD-10-CM

## 2023-06-04 DIAGNOSIS — F17.200: ICD-10-CM

## 2023-06-04 DIAGNOSIS — O47.03: ICD-10-CM

## 2023-06-04 DIAGNOSIS — Z3A.32: ICD-10-CM

## 2023-06-04 DIAGNOSIS — Z88.1: ICD-10-CM

## 2023-06-04 LAB — GLUCOSE BLD-MCNC: 118 MG/DL (ref 70–110)

## 2023-06-04 PROCEDURE — 99213 OFFICE O/P EST LOW 20 MIN: CPT

## 2023-06-04 PROCEDURE — 59025 FETAL NON-STRESS TEST: CPT

## 2023-06-05 VITALS
TEMPERATURE: 97.8 F | DIASTOLIC BLOOD PRESSURE: 73 MMHG | HEART RATE: 76 BPM | SYSTOLIC BLOOD PRESSURE: 128 MMHG | RESPIRATION RATE: 16 BRPM

## 2023-06-15 NOTE — P.MSEPDOC
Presenting Problems





- Arrival Data


Date of Arrival on Unit: 23


Time of Arrival on Unit: 23:12


Mode of Transport: Wheelchair





- Complaint


OB-Reason for Admission/Chief Complaint: Possible Onset of Labor


Comment: Pt states she has been cramping since 





Prenatal Medical History





- Pregnancy Information


: 4


Para: 1


Term: 1


: 0


Abortions: Spontaneous or Elective: 2


Number of Living Children: 1





- Gestational Age


Gestational Age by JAZLYN (wks/days): 32 Weeks and 4 Days





- Prenatal History


Pregnancy Complications: GDM





Review of Systems





- Review of Systems


Constitutional: No problems


Breast: No problems


ENT: No problems


Cardiovascular: No problems


Respiratory: No problems


Gastrointestinal: No problems


Genitourinary: No problems


Musculoskeletal: No problems


Neurological: No problems


Skin: No problems


Comment: Pt has restless leg syndrome, and is sunburned





Vital Signs





- Temperature


Temperature: 97.8 F


Temperature Source: Oral





- Pulse


  ** Right Sitting


Pulse Rate: 76


Pulse Assessment Method: Automatic Cuff





- Respirations


Respiratory Rate: 16


Oxygen Delivery Method: Room Air


O2 Sat by Pulse Oximetry: 100





- Blood Pressure


  ** Right Arm Sitting


Blood Pressure: 128/73


Blood Pressure Mean: 91


Blood Pressure Source: Automatic Cuff





Medical Screen Scoring





- Cervical Exam


Dilation (cm): 0


Membranes: Intact





- Fetal Assessment - Baby A


Baseline FHR: 145


Fetal Heart Rate - NICHD Category: Category I (Normal)


NST: Reactive





Physician Notification





- Physician Notified


Physician Notified Date: 23


Physician Notified Time: 00:00


Physician: Vannesa Alfaro Order Received: Yes (Discharge home)





- Notification Comment


Comment: Pt is to rest and increase fluid intake. Pt is to keep sced apt with 

MFM this Wed, and Karlos next week.





Maternal Fetal Triage Index





- Maternal Fetal Triage Index


Presenting for scheduled procedure w/no complaint: No





- Stat/Priority 1


Stat Priority 1: No





- Urgent/Priority 2


Urgent Priority 2: No





- Prompt/Priority 3


Prompt Priority 3: No





- Non-Urgent/Priority 4


Non-Urgent Priority 4: Yes


Criteria Met for Priority 4: Pt c/o of contractions. Pt has not felt any 

contractions since being here. Cervix is closed.





Disposition





- Disposition


OB Disposition: Discharge to home, Written follow up instructions reviewed


Discharge Date: 23


Discharge Time: 00:05


I agree with the RN Medical Screening Exam: Yes


Case reviewed; plan agreed upon as documented in EMR&OBIX.: Yes


Diagnosis: FALSE LABOR BEFORE 37 COMPLETED WEEKS OF GEST, THIRD TRI

## 2023-06-20 ENCOUNTER — HOSPITAL ENCOUNTER (OUTPATIENT)
Dept: HOSPITAL 47 - FBPOP | Age: 28
Discharge: HOME | End: 2023-06-20
Attending: OBSTETRICS & GYNECOLOGY
Payer: COMMERCIAL

## 2023-06-20 VITALS
TEMPERATURE: 96.9 F | DIASTOLIC BLOOD PRESSURE: 80 MMHG | RESPIRATION RATE: 16 BRPM | HEART RATE: 98 BPM | SYSTOLIC BLOOD PRESSURE: 133 MMHG

## 2023-06-20 DIAGNOSIS — O99.333: ICD-10-CM

## 2023-06-20 DIAGNOSIS — F17.200: ICD-10-CM

## 2023-06-20 DIAGNOSIS — Z88.8: ICD-10-CM

## 2023-06-20 DIAGNOSIS — Z3A.34: ICD-10-CM

## 2023-06-20 DIAGNOSIS — O24.419: ICD-10-CM

## 2023-06-20 DIAGNOSIS — Z88.1: ICD-10-CM

## 2023-06-20 DIAGNOSIS — O21.8: Primary | ICD-10-CM

## 2023-06-20 DIAGNOSIS — Z88.2: ICD-10-CM

## 2023-06-20 LAB — GLUCOSE BLD-MCNC: 118 MG/DL (ref 70–110)

## 2023-06-20 PROCEDURE — 59025 FETAL NON-STRESS TEST: CPT

## 2023-06-20 PROCEDURE — 99213 OFFICE O/P EST LOW 20 MIN: CPT

## 2023-06-29 ENCOUNTER — HOSPITAL ENCOUNTER (OUTPATIENT)
Dept: HOSPITAL 47 - FBPOP | Age: 28
Discharge: HOME | End: 2023-06-29
Attending: OBSTETRICS & GYNECOLOGY
Payer: COMMERCIAL

## 2023-06-29 VITALS
DIASTOLIC BLOOD PRESSURE: 77 MMHG | SYSTOLIC BLOOD PRESSURE: 130 MMHG | TEMPERATURE: 97.7 F | RESPIRATION RATE: 18 BRPM | HEART RATE: 99 BPM

## 2023-06-29 DIAGNOSIS — Z88.8: ICD-10-CM

## 2023-06-29 DIAGNOSIS — O99.333: ICD-10-CM

## 2023-06-29 DIAGNOSIS — F17.200: ICD-10-CM

## 2023-06-29 DIAGNOSIS — Z88.2: ICD-10-CM

## 2023-06-29 DIAGNOSIS — Z88.1: ICD-10-CM

## 2023-06-29 DIAGNOSIS — O24.419: ICD-10-CM

## 2023-06-29 DIAGNOSIS — Z3A.36: ICD-10-CM

## 2023-06-29 DIAGNOSIS — O36.8131: Primary | ICD-10-CM

## 2023-06-29 PROCEDURE — 99213 OFFICE O/P EST LOW 20 MIN: CPT

## 2023-06-29 PROCEDURE — 59025 FETAL NON-STRESS TEST: CPT

## 2023-06-30 NOTE — P.MSEPDOC
Presenting Problems





- Arrival Data


Date of Arrival on Unit: 23


Time of Arrival on Unit: 05:20


Mode of Transport: Ambulatory





- Complaint


OB-Reason for Admission/Chief Complaint: Decreased Fetal Movement


Comment: no fm since 0000





Prenatal Medical History





- Pregnancy Information


: 4


Para: 1


Term: 1


: 0


Abortions: Spontaneous or Elective: 2


Number of Living Children: 1





- Gestational Age


Gestational Age by JAZLYN (wks/days): 36 Weeks and 0 Days





- Prenatal History


Pregnancy Complications: GDM


Comment: diet controlled. cholestasis





Review of Systems





- Review of Systems


Constitutional: No problems


Breast: No problems


ENT: No problems


Cardiovascular: No problems


Respiratory: No problems


Gastrointestinal: No problems


Genitourinary: No problems


Musculoskeletal: No problems


Neurological: No problems


Skin: No problems





Vital Signs





- Temperature


Temperature: 97.7 F


Temperature Source: Temporal Artery Scan





- Pulse


  ** Right


Pulse Rate: 99


Pulse Assessment Method: Pulse Oximetry





- Respirations


Respiratory Rate: 18


O2 Sat by Pulse Oximetry: 99





- Blood Pressure


  ** Right Arm


Blood Pressure: 130/77


Blood Pressure Mean: 94


Blood Pressure Source: Automatic Cuff





Medical Screen Scoring





- Uterine Contractions


Intensity: Absent





- Fetal Assessment - Baby A


Baseline FHR: 135


Fetal Heart Rate - NICHD Category: Category I (Normal)


NST: Reactive





Physician Notification





- Physician Notified


Physician Notified Date: 23


Physician Notified Time: 06:17


Physician: Rodolfo Madrigal





- Notification Comment


Comment: reported on pts c/o decreased fm, maternal hx, u/s in office today, 

reactive nst in triage





Maternal Fetal Triage Index





- Maternal Fetal Triage Index


Presenting for scheduled procedure w/no complaint: No





- Stat/Priority 1


Stat Priority 1: No





- Urgent/Priority 2


Urgent Priority 2: No





- Prompt/Priority 3


Prompt Priority 3: No





- Non-Urgent/Priority 4


Non-Urgent Priority 4: Yes


Criteria Met for Priority 4: 36/0. decreased fm





Disposition





- Disposition


OB Disposition: Discharge to home


Discharge Date: 23


Discharge Time: 06:40


I agree with the RN Medical Screening Exam: Yes


Case reviewed; plan agreed upon as documented in EMR&OBIX.: Yes


Diagnosis: DECREASED FETAL MOVEMENTS, THIRD TRIMESTER, FETUS 1

## 2023-07-02 NOTE — P.MSEPDOC
Presenting Problems





- Arrival Data


Date of Arrival on Unit: 23


Time of Arrival on Unit: 01:49


Mode of Transport: Ambulatory





- Complaint


OB-Reason for Admission/Chief Complaint: Possible Onset of Labor


Comment: patient states she began neville at 0100 7 minutes apart, possible 

rupture of membranes with clear fluid.





Prenatal Medical History





- Pregnancy Information


: 4


Para: 1


Term: 1


: 0


Abortions: Spontaneous or Elective: 2


Number of Living Children: 1





- Gestational Age


Gestational Age by JAZLYN (wks/days): 30 Weeks and 6 Days





- Prenatal History


Pregnancy Complications: GDM





Review of Systems





- Review of Systems


Constitutional: No problems


Breast: No problems


ENT: No problems


Cardiovascular: No problems


Respiratory: No problems


Gastrointestinal: No problems


Genitourinary: No problems


Musculoskeletal: No problems


Neurological: No problems


Skin: No problems





Vital Signs





- Temperature


Temperature: 97.3 F


Temperature Source: Temporal Artery Scan





- Pulse


  ** Right Brachial


Pulse Rate: 89


Pulse Assessment Method: Automatic Cuff





- Respirations


Respiratory Rate: 16


Oxygen Delivery Method: Room Air


O2 Sat by Pulse Oximetry: 100





- Blood Pressure


  ** Right Arm


Blood Pressure: 120/69


Blood Pressure Mean: 86


Blood Pressure Source: Automatic Cuff





Medical Screen Scoring





- Cervical Exam


Membranes: Intact





- Fetal Assessment - Baby A


Baseline FHR: 120


Fetal Heart Rate - NICHD Category: Category I (Normal)


NST: Reactive





Physician Notification





- Physician Notified


Physician Notified Date: 23


Physician Notified Time: 02:28


Physician: Guadalupe Everett


New Order Received: Yes





- Notification Comment


Comment: Dr. Everett called with report. Category 1 heart tones. No contractions 

per toco palpation or patient. Amnisure negative. Cervical exam closed thick and

high. vitals within normal range. Patient to be discharged home and keep next 

scheduled appt.





Maternal Fetal Triage Index





- Maternal Fetal Triage Index


Presenting for scheduled procedure w/no complaint: No





- Stat/Priority 1


Stat Priority 1: No





- Urgent/Priority 2


Urgent Priority 2: Yes


Provider Notified: Guadalupe Everett


Provider Notified Time: 01:57


Criteria Met for Priority 2: rule our ROM and contractions patient is 30 6/7





Disposition





- Disposition


OB Disposition: Discharge to home


Discharge Date: 23


Discharge Time: 02:40


I agree with the RN Medical Screening Exam: Yes


Case reviewed; plan agreed upon as documented in EMR&OBIX.: Yes


Diagnosis: FALSE LABOR BEFORE 37 COMPLETED WEEKS OF GEST, THIRD TRI

## 2023-07-02 NOTE — P.MSEPDOC
Presenting Problems





- Arrival Data


Date of Arrival on Unit: 23


Time of Arrival on Unit: 14:27


Mode of Transport: Ambulatory





- Complaint


OB-Reason for Admission/Chief Complaint: Observation/Evaluation


Comment: Pt reports to triage c/o nausea without vomitting, diarrhea x 1 today, 

hunger without an appetite, headache, restlessness, cold sweats without a fever 

and cramping. Pt reports  @ 1130.





Prenatal Medical History





- Pregnancy Information


: 4


Para: 1


Term: 1


Abortions: Spontaneous or Elective: 2


Number of Living Children: 1





- Gestational Age


Gestational Age by JAZLYN (wks/days): 34 Weeks and 5 Days





- Prenatal History


Pregnancy Complications: GDM


Comment: GDM, Cholestasis





Review of Systems





- Review of Systems


Constitutional: No problems


Breast: No problems


ENT: No problems


Cardiovascular: No problems


Respiratory: No problems


Gastrointestinal: Diarrhea


Genitourinary: No problems


Musculoskeletal: No problems


Neurological: No problems


Skin: No problems





Vital Signs





- Temperature


Temperature: 96.9 F


Temperature Source: Temporal Artery Scan





- Pulse


  ** Right Sitting Brachial


Pulse Rate: 98


Pulse Assessment Method: Automatic Cuff





- Respirations


Respiratory Rate: 16


Oxygen Delivery Method: Room Air


O2 Sat by Pulse Oximetry: 96





- Blood Pressure


  ** Right Arm Sitting


Blood Pressure: 133/80


Blood Pressure Mean: 97


Blood Pressure Source: Automatic Cuff





Medical Screen Scoring





- Cervical Exam


Dilation (cm): 1


Effacement (%): 50


Station: -2


Membranes: Intact





- Uterine Contractions


Frequency From (mins): 1


Frequency To (mins): 3


Duration From (seconds): 20


Duration To (seconds): 40


Intensity: Mild


Resting: Soft to palpation





- Fetal Assessment - Baby A


Baseline FHR: 140


Fetal Heart Rate - NICHD Category: Category I (Normal)


NST: Reactive





Physician Notification





- Physician Notified


Physician Notified Date: 23


Physician Notified Time: 15:20


Physician: Guadalupe Everett


New Order Received: Yes





- Notification Comment


Comment: Spk c\Dr. Everett, [facility] of pts arrival to triage, , 34 5/7, c/o 

nausea without vomitting, diarrhea x 1, headache, restlessness, cold sweats-

afebrile, and cramping. Pt with GDM, accuchek 118, +FM, SVE 1/50/-2, reactive, 

Cat 1 NST. Pt reports feeling better since arrival to triage. Orders rec'd to 

d/c home, follow up as scheduled.





Maternal Fetal Triage Index





- Maternal Fetal Triage Index


Presenting for scheduled procedure w/no complaint: No





- Stat/Priority 1


Stat Priority 1: No





- Urgent/Priority 2


Urgent Priority 2: No





- Prompt/Priority 3


Prompt Priority 3: No





- Non-Urgent/Priority 4


Non-Urgent Priority 4: Yes


Criteria Met for Priority 4: Discomforts of pregnancy





Disposition





- Disposition


OB Disposition: Written follow up instructions reviewed


Discharge Date: 23


Discharge Time: 15:25


I agree with the RN Medical Screening Exam: Yes


Case reviewed; plan agreed upon as documented in EMR&OBIX.: Yes


Diagnosis: VOMITING OF PREGNANCY, UNSPECIFIED

## 2023-07-06 ENCOUNTER — HOSPITAL ENCOUNTER (INPATIENT)
Dept: HOSPITAL 47 - 4FBP | Age: 28
LOS: 1 days | Discharge: HOME | DRG: 560 | End: 2023-07-07
Attending: OBSTETRICS & GYNECOLOGY | Admitting: OBSTETRICS & GYNECOLOGY
Payer: COMMERCIAL

## 2023-07-06 VITALS — RESPIRATION RATE: 16 BRPM

## 2023-07-06 DIAGNOSIS — Z88.8: ICD-10-CM

## 2023-07-06 DIAGNOSIS — F31.9: ICD-10-CM

## 2023-07-06 DIAGNOSIS — Z3A.37: ICD-10-CM

## 2023-07-06 DIAGNOSIS — Z82.5: ICD-10-CM

## 2023-07-06 DIAGNOSIS — F90.9: ICD-10-CM

## 2023-07-06 DIAGNOSIS — F41.9: ICD-10-CM

## 2023-07-06 DIAGNOSIS — Z88.2: ICD-10-CM

## 2023-07-06 DIAGNOSIS — J45.909: ICD-10-CM

## 2023-07-06 DIAGNOSIS — K76.89: ICD-10-CM

## 2023-07-06 DIAGNOSIS — Z88.1: ICD-10-CM

## 2023-07-06 LAB
BASOPHILS # BLD AUTO: 0.1 K/UL (ref 0–0.2)
BASOPHILS NFR BLD AUTO: 1 %
EOSINOPHIL # BLD AUTO: 0.3 K/UL (ref 0–0.7)
EOSINOPHIL NFR BLD AUTO: 2 %
ERYTHROCYTE [DISTWIDTH] IN BLOOD BY AUTOMATED COUNT: 3.99 M/UL (ref 3.8–5.4)
ERYTHROCYTE [DISTWIDTH] IN BLOOD: 13.7 % (ref 11.5–15.5)
GLUCOSE BLD-MCNC: 100 MG/DL (ref 70–110)
GLUCOSE BLD-MCNC: 84 MG/DL (ref 70–110)
HCT VFR BLD AUTO: 34.7 % (ref 34–46)
HGB BLD-MCNC: 11 GM/DL (ref 11.4–16)
LYMPHOCYTES # SPEC AUTO: 3 K/UL (ref 1–4.8)
LYMPHOCYTES NFR SPEC AUTO: 23 %
MCH RBC QN AUTO: 27.5 PG (ref 25–35)
MCHC RBC AUTO-ENTMCNC: 31.7 G/DL (ref 31–37)
MCV RBC AUTO: 86.8 FL (ref 80–100)
MONOCYTES # BLD AUTO: 0.7 K/UL (ref 0–1)
MONOCYTES NFR BLD AUTO: 6 %
NEUTROPHILS # BLD AUTO: 8.5 K/UL (ref 1.3–7.7)
NEUTROPHILS NFR BLD AUTO: 65 %
PLATELET # BLD AUTO: 249 K/UL (ref 150–450)
WBC # BLD AUTO: 13 K/UL (ref 3.8–10.6)

## 2023-07-06 PROCEDURE — 80306 DRUG TEST PRSMV INSTRMNT: CPT

## 2023-07-06 PROCEDURE — 3E0R3BZ INTRODUCTION OF ANESTHETIC AGENT INTO SPINAL CANAL, PERCUTANEOUS APPROACH: ICD-10-PCS

## 2023-07-06 PROCEDURE — 10907ZC DRAINAGE OF AMNIOTIC FLUID, THERAPEUTIC FROM PRODUCTS OF CONCEPTION, VIA NATURAL OR ARTIFICIAL OPENING: ICD-10-PCS

## 2023-07-06 PROCEDURE — 86900 BLOOD TYPING SEROLOGIC ABO: CPT

## 2023-07-06 PROCEDURE — 86901 BLOOD TYPING SEROLOGIC RH(D): CPT

## 2023-07-06 PROCEDURE — 86850 RBC ANTIBODY SCREEN: CPT

## 2023-07-06 PROCEDURE — 00HU33Z INSERTION OF INFUSION DEVICE INTO SPINAL CANAL, PERCUTANEOUS APPROACH: ICD-10-PCS

## 2023-07-06 PROCEDURE — 85025 COMPLETE CBC W/AUTO DIFF WBC: CPT

## 2023-07-06 RX ADMIN — POTASSIUM CHLORIDE SCH MLS/HR: 14.9 INJECTION, SOLUTION INTRAVENOUS at 05:59

## 2023-07-06 RX ADMIN — DOCUSATE SODIUM AND SENNOSIDES SCH EACH: 50; 8.6 TABLET ORAL at 19:54

## 2023-07-06 RX ADMIN — POTASSIUM CHLORIDE SCH MLS/HR: 14.9 INJECTION, SOLUTION INTRAVENOUS at 10:00

## 2023-07-06 RX ADMIN — IBUPROFEN PRN MG: 600 TABLET ORAL at 16:46

## 2023-07-06 NOTE — P.HPOB
History of Present Illness


H&P Date: 23


Chief Complaint: Induction of labor





28 year old  presents at 37 weeks for induction of labor due to cholestasis 

of pregnancy. Her cervix is 1-2/60/-2. She is neville irregularly. Fetal 

heart tones 145 with moderate variability and reactive.





Review of Systems


All systems: negative


Constitutional: Denies chills, Denies fever


Eyes: denies blurred vision, denies pain


Ears, nose, mouth and throat: Denies headache, Denies sore throat


Cardiovascular: Denies chest pain, Denies shortness of breath


Respiratory: Denies cough


Gastrointestinal: Denies abdominal pain, Denies diarrhea, Denies nausea, Denies 

vomiting


Genitourinary: Denies dysuria, Denies hematuria


Musculoskeletal: Denies myalgias


Integumentary: Denies pruritus, Denies rash


Neurological: Denies numbness, Denies weakness


Psychiatric: Denies anxiety, Denies depression


Endocrine: Denies fatigue, Denies weight change





Past Medical History


Past Medical History: Asthma


Additional Past Medical History / Comment(s): hypoglycemia, elevated liver 

enzymes


History of Any Multi-Drug Resistant Organisms: None Reported


Past Surgical History: Tonsillectomy


Additional Past Surgical History / Comment(s): oral surgery


Past Anesthesia/Blood Transfusion Reactions: No Reported Reaction


Past Psychological History: ADD/ADHD, Anxiety, Bipolar, Depression


Smoking Status: Vaper


Past Alcohol Use History: None Reported


Past Drug Use History: None Reported





- Past Family History


  ** Mother


Family Medical History: Asthma, Thyroid Disorder





Medications and Allergies


                                Home Medications











 Medication  Instructions  Recorded  Confirmed  Type


 


Albuterol Sulfate [Ventolin HFA] 1 - 2 puff INHALATION RT-Q6H PRN 10/12/19 

07/06/23 History


 


Famotidine [Pepcid] 20 mg PO BID #28 tablet 23 Rx


 


Dextroamphetamine/Amphetamine 20 mg PO BID 23 History





[Adderall]    


 


Prenatal Vit No.179/Iron/Folic 1 each PO DAILY 23 History





[Prenatal Tablet]    


 


hydrOXYzine HCL 10 mg PO BID 23 History


 


ursodioL [Ursodiol] 300 mg PO TID 23 History








                                    Allergies











Allergy/AdvReac Type Severity Reaction Status Date / Time


 


methylphenidate HCl Allergy Mild Unknown Verified 23 05:56





[From Metadate CD]     


 


trimethoprim [From Bactrim] Allergy Mild Rash/Hives Verified 23 05:56


 


azithromycin Allergy  Rash/Hives Verified 23 05:56


 


sulfamethoxazole Allergy  Rash/Hives Verified 23 05:56





[From Bactrim]     


 


cephalexin [From Keflex] AdvReac Mild Nausea & Verified 23 05:56





   Vomiting  














Exam


Osteopathic Statement: *.  No significant issues noted on an osteopathic str

uctural exam other than those noted in the History and Physical/Consult.


                                   Vital Signs











  Temp Pulse Resp BP Pulse Ox


 


 23 06:19  97.5 F L  94  18  123/79  98








                                Intake and Output











 23





 22:59 06:59 14:59


 


Other:   


 


  # Voids  1 


 


  Weight  66.678 kg 














Heart: Regular rate and rhythm


Lungs: Clear to auscultation bilaterally


Abdomen: Soft, nontender


Extremities: Negative Homans sign





Results


Result Diagrams: 


                                 23 05:50





                  Abnormal Lab Results - Last 24 Hours (Table)











  23 Range/Units





  05:50 05:50 


 


WBC   13.0 H  (3.8-10.6)  k/uL


 


Hgb   11.0 L  (11.4-16.0)  gm/dL


 


Neutrophils #   8.5 H  (1.3-7.7)  k/uL


 


U Marijuana (THC) Screen  Detected H   (NotDetected)  














Assessment and Plan


(1) Cholestasis of pregnancy


Current Visit: Yes   Status: Acute   Code(s): O26.619 - LIVER AND BILIARY TRACT 

DISORD IN PREGNANCY, UNSP TRIMESTER; K83.1 - OBSTRUCTION OF BILE DUCT   SNOMED 

Code(s): 323334175


   





(2) Encounter for induction of labor


Current Visit: Yes   Status: Acute   Code(s): Z34.90 - ENCNTR FOR SUPRVSN OF 

NORMAL PREGNANCY, UNSP, UNSP TRIMESTER   SNOMED Code(s): 658829045


   


Plan: 





1. induction of labor with amniotomy and pitocin. 


2. anticipate normal vaginal delivery

## 2023-07-07 VITALS — SYSTOLIC BLOOD PRESSURE: 123 MMHG | TEMPERATURE: 97.8 F | HEART RATE: 70 BPM | DIASTOLIC BLOOD PRESSURE: 74 MMHG

## 2023-07-07 LAB
BASOPHILS # BLD AUTO: 0.1 K/UL (ref 0–0.2)
BASOPHILS NFR BLD AUTO: 0 %
EOSINOPHIL # BLD AUTO: 0.3 K/UL (ref 0–0.7)
EOSINOPHIL NFR BLD AUTO: 2 %
ERYTHROCYTE [DISTWIDTH] IN BLOOD BY AUTOMATED COUNT: 3.73 M/UL (ref 3.8–5.4)
ERYTHROCYTE [DISTWIDTH] IN BLOOD: 13.6 % (ref 11.5–15.5)
HCT VFR BLD AUTO: 33.1 % (ref 34–46)
HGB BLD-MCNC: 10.9 GM/DL (ref 11.4–16)
LYMPHOCYTES # SPEC AUTO: 3 K/UL (ref 1–4.8)
LYMPHOCYTES NFR SPEC AUTO: 19 %
MCH RBC QN AUTO: 29.2 PG (ref 25–35)
MCHC RBC AUTO-ENTMCNC: 33 G/DL (ref 31–37)
MCV RBC AUTO: 88.7 FL (ref 80–100)
MONOCYTES # BLD AUTO: 0.9 K/UL (ref 0–1)
MONOCYTES NFR BLD AUTO: 6 %
NEUTROPHILS # BLD AUTO: 10.9 K/UL (ref 1.3–7.7)
NEUTROPHILS NFR BLD AUTO: 71 %
PLATELET # BLD AUTO: 218 K/UL (ref 150–450)
WBC # BLD AUTO: 15.4 K/UL (ref 3.8–10.6)

## 2023-07-07 RX ADMIN — IBUPROFEN PRN MG: 600 TABLET ORAL at 12:25

## 2023-07-07 RX ADMIN — IBUPROFEN PRN MG: 600 TABLET ORAL at 05:42

## 2023-07-07 RX ADMIN — ACETAMINOPHEN PRN MG: 325 TABLET, FILM COATED ORAL at 07:02

## 2023-07-07 RX ADMIN — ACETAMINOPHEN PRN MG: 325 TABLET, FILM COATED ORAL at 14:02

## 2023-07-07 RX ADMIN — DOCUSATE SODIUM AND SENNOSIDES SCH EACH: 50; 8.6 TABLET ORAL at 07:59

## 2023-07-07 NOTE — P.PROBDLV
Vaginal Delivery Note





- .


Vaginal Delivery Note: 





28 year old  presents at 37 weeks for induction of labor due to cholestasis 

of pregnancy. Her cervix is 1-2/60/-2. She is neville irregularly. Fetal 

heart tones 145 with moderate variability and reactive.  Pitocin augmentation 

was started and then amniotomy performed at 7:28 AM, clear fluid noted.  She got

uncomfortable rather quickly and had some Nubain.  She then got an epidural when

she was very uncomfortable.  Her cervix was completely dilated at 1425.  She 

pushed, and delivered a viable female infant over intact perineum under epidural

anesthesia at 1436.  Head delivered OA, anterior shoulder delivered gentle 

downward guidance for by posterior shoulder and rest of body.  Nose and mouth 

bulb suctioned, cord clamped and cut, infant placed mother's abdomen.  Apgars 9,

9, weight 5 lbs. 15 oz.  Placenta delivered spontaneously, intact with 

three-vessel cord at 1439.  Vagina, cervix, perineum inspected.  No lacerations 

noted.  Estimated blood loss 150 mL.  Mother and baby in stable condition.

## 2023-07-07 NOTE — P.DS
Providers


Date of admission: 


07/06/23 05:50





Expected date of discharge: 07/07/23


Attending physician: 


Guadalupe Everett





Primary care physician: 


Stated None








- Discharge Diagnosis(es)


(1) Cholestasis of pregnancy


Current Visit: Yes   Status: Resolved   





(2) Encounter for induction of labor


Current Visit: Yes   Status: Resolved   





(3) Status post normal vaginal delivery


Current Visit: Yes   Status: Acute   


Hospital Course: 





Patient presented for induction of labor.  She underwent a normal vaginal 

delivery.  Postpartum course has been uneventful.  She denies nausea, vomiting, 

chest pain, shortness of breath or calf pain.  She'll be discharged home 

postpartum day #1 in stable condition to follow-up with me in 6 weeks.





Plan - Discharge Summary


New Discharge Prescriptions: 


New


   Ibuprofen [Motrin] 600 mg PO Q6HR PRN #30 tab


     PRN Reason: Mild Pain (Scale 1 To 3)





No Action


   Albuterol Sulfate [Ventolin HFA] 1 - 2 puff INHALATION RT-Q6H PRN


     PRN Reason: Shortness Of Breath


   Dextroamphetamine/Amphetamine [Adderall] 20 mg PO BID


   ursodioL [Ursodiol] 300 mg PO TID


   hydrOXYzine HCL 10 mg PO BID


   Famotidine [Pepcid] 20 mg PO BID #28 tablet


   Prenatal Vit No.179/Iron/Folic [Prenatal Tablet] 1 each PO DAILY


Discharge Medication List





Albuterol Sulfate [Ventolin HFA] 1 - 2 puff INHALATION RT-Q6H PRN 10/12/19 

[History]


Famotidine [Pepcid] 20 mg PO BID #28 tablet 02/18/23 [Rx]


Dextroamphetamine/Amphetamine [Adderall] 20 mg PO BID 03/18/23 [History]


Prenatal Vit No.179/Iron/Folic [Prenatal Tablet] 1 each PO DAILY 03/18/23 

[History]


hydrOXYzine HCL 10 mg PO BID 06/29/23 [History]


ursodioL [Ursodiol] 300 mg PO TID 06/29/23 [History]


Ibuprofen [Motrin] 600 mg PO Q6HR PRN #30 tab 07/07/23 [Rx]








Follow up Appointment(s)/Referral(s): 


Guadalupe Everett DO [Doctor of Osteopathic Medicine] - 08/13/23 10:45 am


Discharge Disposition: HOME SELF-CARE

## 2023-07-10 ENCOUNTER — HOSPITAL ENCOUNTER (EMERGENCY)
Dept: HOSPITAL 47 - EC | Age: 28
LOS: 1 days | Discharge: HOME | End: 2023-07-11
Payer: COMMERCIAL

## 2023-07-10 VITALS — RESPIRATION RATE: 18 BRPM | TEMPERATURE: 98.5 F

## 2023-07-10 DIAGNOSIS — Z79.899: ICD-10-CM

## 2023-07-10 DIAGNOSIS — Z88.6: ICD-10-CM

## 2023-07-10 DIAGNOSIS — Z88.8: ICD-10-CM

## 2023-07-10 DIAGNOSIS — F90.9: ICD-10-CM

## 2023-07-10 DIAGNOSIS — F41.9: ICD-10-CM

## 2023-07-10 DIAGNOSIS — J45.909: ICD-10-CM

## 2023-07-10 DIAGNOSIS — Z88.1: ICD-10-CM

## 2023-07-10 DIAGNOSIS — Z88.2: ICD-10-CM

## 2023-07-10 DIAGNOSIS — F17.290: ICD-10-CM

## 2023-07-10 DIAGNOSIS — R10.31: Primary | ICD-10-CM

## 2023-07-10 DIAGNOSIS — F31.9: ICD-10-CM

## 2023-07-10 LAB
ALBUMIN SERPL-MCNC: 3.5 G/DL (ref 3.5–5)
ALP SERPL-CCNC: 175 U/L (ref 38–126)
ALT SERPL-CCNC: 37 U/L (ref 4–34)
AMYLASE SERPL-CCNC: 75 U/L (ref 30–110)
ANION GAP SERPL CALC-SCNC: 6 MMOL/L
AST SERPL-CCNC: 38 U/L (ref 14–36)
BASOPHILS # BLD AUTO: 0.1 K/UL (ref 0–0.2)
BASOPHILS NFR BLD AUTO: 1 %
BUN SERPL-SCNC: 13 MG/DL (ref 7–17)
CALCIUM SPEC-MCNC: 9.6 MG/DL (ref 8.4–10.2)
CHLORIDE SERPL-SCNC: 103 MMOL/L (ref 98–107)
CO2 SERPL-SCNC: 28 MMOL/L (ref 22–30)
EOSINOPHIL # BLD AUTO: 0.4 K/UL (ref 0–0.7)
EOSINOPHIL NFR BLD AUTO: 3 %
ERYTHROCYTE [DISTWIDTH] IN BLOOD BY AUTOMATED COUNT: 4.28 M/UL (ref 3.8–5.4)
ERYTHROCYTE [DISTWIDTH] IN BLOOD: 13.5 % (ref 11.5–15.5)
GLUCOSE SERPL-MCNC: 73 MG/DL (ref 74–99)
HCT VFR BLD AUTO: 37.8 % (ref 34–46)
HGB BLD-MCNC: 12.1 GM/DL (ref 11.4–16)
LIPASE SERPL-CCNC: 254 U/L (ref 23–300)
LYMPHOCYTES # SPEC AUTO: 2.3 K/UL (ref 1–4.8)
LYMPHOCYTES NFR SPEC AUTO: 20 %
MCH RBC QN AUTO: 28.2 PG (ref 25–35)
MCHC RBC AUTO-ENTMCNC: 31.9 G/DL (ref 31–37)
MCV RBC AUTO: 88.4 FL (ref 80–100)
MONOCYTES # BLD AUTO: 0.8 K/UL (ref 0–1)
MONOCYTES NFR BLD AUTO: 7 %
NEUTROPHILS # BLD AUTO: 7.8 K/UL (ref 1.3–7.7)
NEUTROPHILS NFR BLD AUTO: 67 %
PLATELET # BLD AUTO: 297 K/UL (ref 150–450)
POTASSIUM SERPL-SCNC: 3.9 MMOL/L (ref 3.5–5.1)
PROT SERPL-MCNC: 6.6 G/DL (ref 6.3–8.2)
SODIUM SERPL-SCNC: 137 MMOL/L (ref 137–145)
WBC # BLD AUTO: 11.6 K/UL (ref 3.8–10.6)

## 2023-07-10 PROCEDURE — 83690 ASSAY OF LIPASE: CPT

## 2023-07-10 PROCEDURE — 80053 COMPREHEN METABOLIC PANEL: CPT

## 2023-07-10 PROCEDURE — 99284 EMERGENCY DEPT VISIT MOD MDM: CPT

## 2023-07-10 PROCEDURE — 93975 VASCULAR STUDY: CPT

## 2023-07-10 PROCEDURE — 85025 COMPLETE CBC W/AUTO DIFF WBC: CPT

## 2023-07-10 PROCEDURE — 96376 TX/PRO/DX INJ SAME DRUG ADON: CPT

## 2023-07-10 PROCEDURE — 96374 THER/PROPH/DIAG INJ IV PUSH: CPT

## 2023-07-10 PROCEDURE — 36415 COLL VENOUS BLD VENIPUNCTURE: CPT

## 2023-07-10 PROCEDURE — 83605 ASSAY OF LACTIC ACID: CPT

## 2023-07-10 PROCEDURE — 81001 URINALYSIS AUTO W/SCOPE: CPT

## 2023-07-10 PROCEDURE — 86140 C-REACTIVE PROTEIN: CPT

## 2023-07-10 PROCEDURE — 82150 ASSAY OF AMYLASE: CPT

## 2023-07-10 PROCEDURE — 76856 US EXAM PELVIC COMPLETE: CPT

## 2023-07-10 PROCEDURE — 96361 HYDRATE IV INFUSION ADD-ON: CPT

## 2023-07-11 VITALS — SYSTOLIC BLOOD PRESSURE: 126 MMHG | DIASTOLIC BLOOD PRESSURE: 84 MMHG | HEART RATE: 71 BPM

## 2023-07-11 LAB
PH UR: 7 [PH] (ref 5–8)
RBC UR QL: 18 /HPF (ref 0–5)
SP GR UR: 1.01 (ref 1–1.03)
SQUAMOUS UR QL AUTO: 1 /HPF (ref 0–4)
UROBILINOGEN UR QL STRIP: <2 MG/DL (ref ?–2)
WBC #/AREA URNS HPF: 30 /HPF (ref 0–5)

## 2023-07-11 NOTE — ED
Abdominal Pain HPI





- General


Source: patient


Mode of arrival: wheelchair


Limitations: no limitations





- History of Present Illness


MD Complaint: abdominal pain


-: hour(s)


Location: RLQ


Radiation: none


Migration to: no migration


Severity: severe


Quality: aching


Consistency: constant


Improves With: nothing


Worsens With: nothing


Associated Symptoms: denies other symptoms





- Related Data


LMP (females 10-50): other





<Senthil Zuniga - Last Filed: 07/11/23 02:42>





<Sinan Conklin - Last Filed: 07/11/23 03:56>





- General


Chief Complaint: Abdominal Pain


Stated Complaint: Pain in lower abdomen, 4 days PP


Time Seen by Provider: 07/10/23 22:54





- History of Present Illness


Initial Comments: 





This patient is a 28-year-old woman who presents to have evaluation of lower 

abdominal pain greater on the right side, and having past 2 moderate sized 

clots.  The patient is now postpartum day four, after a normal spontaneous 

vaginal birth.  She states there were no complications, she stated today and 

then went home.  She states she had been doing fairly well and then over the 

course of tonight noticed increasing pain and she passed the clots.  She has not

had fever or chills.  She has not had nausea, vomiting, diarrhea.  There have 

been no urinary symptoms.  No frequency, urgency, dysuria.  There is no chest 

pain, dyspnea, palpitations, leg pain or swelling. (Senthil Zuniga)





- Related Data


                                Home Medications











 Medication  Instructions  Recorded  Confirmed


 


Albuterol Sulfate [Ventolin HFA] 1 - 2 puff INHALATION RT-Q6H PRN 10/12/19 

07/06/23


 


Dextroamphetamine/Amphetamine 20 mg PO BID 03/18/23 07/06/23





[Adderall]   


 


Prenatal Vit No.179/Iron/Folic 1 each PO DAILY 03/18/23 07/06/23





[Prenatal Tablet]   


 


hydrOXYzine HCL 10 mg PO BID 06/29/23 07/06/23


 


ursodioL [Ursodiol] 300 mg PO TID 06/29/23 07/06/23








                                  Previous Rx's











 Medication  Instructions  Recorded


 


Famotidine [Pepcid] 20 mg PO BID #28 tablet 02/18/23


 


Ibuprofen [Motrin] 600 mg PO Q6HR PRN #30 tab 07/07/23











                                    Allergies











Allergy/AdvReac Type Severity Reaction Status Date / Time


 


methylphenidate HCl Allergy Mild Unknown Verified 07/10/23 22:46





[From Metadate CD]     


 


trimethoprim [From Bactrim] Allergy Mild Rash/Hives Verified 07/10/23 22:46


 


azithromycin Allergy  Rash/Hives Verified 07/10/23 22:46


 


sulfamethoxazole Allergy  Rash/Hives Verified 07/10/23 22:46





[From Bactrim]     


 


cephalexin [From Keflex] AdvReac Mild Nausea & Verified 07/10/23 22:46





   Vomiting  














Review of Systems


ROS Other: All systems not noted in ROS Statement are negative.


Constitutional: Denies: fever, chills


Respiratory: Denies: cough, dyspnea


Cardiovascular: Denies: chest pain, palpitations, edema


Gastrointestinal: Reports: abdominal pain.  Denies: nausea, vomiting, diarrhea, 

constipation


Genitourinary: Reports: discharge (2 large clots).  Denies: dysuria, hematuria


Musculoskeletal: Denies: back pain


Skin: Denies: rash


Neurological: Denies: headache, weakness, numbness





<Senthil Zuniga - Last Filed: 07/11/23 02:42>


ROS Other: All systems not noted in ROS Statement are negative.





<Sinan Conklin - Last Filed: 07/11/23 03:56>


ROS Statement: 


Those systems with pertinent positive or pertinent negative responses have been 

documented in the HPI.








Past Medical History


Past Medical History: Asthma


Additional Past Medical History / Comment(s): hypoglycemia, elevated liver 

enzymes


History of Any Multi-Drug Resistant Organisms: None Reported


Past Surgical History: Tonsillectomy


Additional Past Surgical History / Comment(s): oral surgery


Past Anesthesia/Blood Transfusion Reactions: No Reported Reaction


Past Psychological History: ADD/ADHD, Anxiety, Bipolar, Depression


Smoking Status: Vaper


Past Alcohol Use History: None Reported


Past Drug Use History: None Reported





- Past Family History


  ** Mother


Family Medical History: Asthma, Thyroid Disorder





<Senthil Zuniga - Last Filed: 07/11/23 02:42>





General Exam


Limitations: no limitations


General appearance: alert, in no apparent distress


Head exam: Present: atraumatic, normocephalic


Eye exam: Present: normal appearance.  Absent: scleral icterus, conjunctival 

injection


Neck exam: Present: normal inspection


Respiratory exam: Present: normal lung sounds bilaterally.  Absent: respiratory 

distress, wheezes, rales, rhonchi, stridor


Cardiovascular Exam: Present: regular rate, normal rhythm, normal heart sounds. 

Absent: systolic murmur, diastolic murmur, rubs, gallop


GI/Abdominal exam: Present: soft, tenderness (Mild suprapubic and right lower 

quadrant tenderness, no rebound or guarding).  Absent: distended, guarding, 

rebound, rigid, mass, pulsatile mass, hernia


External exam: Present: normal external exam.  Absent: lacerations


By manual exam: Present: other (Mild right lower quadrant tenderness).  Absent: 

uterine enlargement, uterine tenderness


Extremities exam: Present: normal inspection, normal capillary refill.  Absent: 

pedal edema, calf tenderness


Back exam: Present: normal inspection.  Absent: CVA tenderness (R), CVA 

tenderness (L)


Neurological exam: Present: alert


Skin exam: Present: warm, dry, intact, normal color.  Absent: rash





<Senthil Zuniga - Last Filed: 07/11/23 02:42>





Course


                                   Vital Signs











  07/10/23 07/11/23 07/11/23





  22:43 02:25 03:51


 


Temperature 98.5 F  


 


Pulse Rate 90 70 71


 


Respiratory 18 18 18





Rate   


 


Blood Pressure 129/88 123/59 126/84


 


O2 Sat by Pulse 100 97 98





Oximetry   














Medical Decision Making





- Lab Data


Result diagrams: 


                                 07/10/23 23:16





                                 07/10/23 23:16





<Senthil Zuniga - Last Filed: 07/11/23 02:42>





- Lab Data


Result diagrams: 


                                 07/10/23 23:16





                                 07/10/23 23:16





<Sinan Conklin - Last Filed: 07/11/23 03:56>





- Medical Decision Making











Was pt. sent in by a medical professional or institution (Dr. PA, NP, urgent 

care, hospital, or nursing home...) When possible be specific


@  -[No]


Did you speak to anyone other than the patient for history (EMS, parent, family,

 police, friend...)? What history was obtained from this source 


@  -[No]


Did you review nursing and triage notes (agree or disagree)?  Why? 


@  -[I reviewed and agree with nursing and triage notes]


Were old charts reviewed (outside hosp., previous admission, EMS record, old 

EKG, old radiological studies, urgent care reports/EKG's, nursing home records)?

Report findings 


@  -[No old charts were reviewed]


Differential Diagnosis (chest pain, altered mental status, abdominal pain women,

abdominal pain men, vaginal bleeding, weakness, fever, dyspnea, syncope, 

headache, dizziness, GI bleed, back pain, seizure, CVA, palpatations, mental 

health, musculoskeletal)? 


@  -[Differential Abdominal Pain Women:


Appendicitis, Cholecystitis, diverticulosis, ischemic bowel, pancreatitis, 

hepatitis, UTI, gastroenteritis, AAA, incarcerated hernia, bowel obstruction, 

constipation, inflammatory bowel, hepatitis, peptic ulcer disease, splenic 

infarction, perforated viscus, vulvitis, ovarian torsion, PID, kidney stone, 

placenta abruption, this is not meant to be an all-inclusive list


EKG interpreted by me (3pts min.).


@  -[


X-rays interpreted by me (1pt min.).


@  -[None done]


CT interpreted by me (1pt min.).


@  -[None done]


U/S interpreted by me (1pt. min.).


@  -[None done]


What testing was considered but not performed or refused? (CT, X-rays, U/S, 

labs)? Why?


@  -[None]


What meds were considered but not given or refused? Why?


@  -[None]


Did you discuss the management of the patient with other professionals 

(professionals i.e. , PA, NP, lab, RT, psych nurse, , , 

teacher, , )? Give summary


@  -[No]


Was smoking cessation discussed for >3mins.?


@  -[No]


Was critical care preformed (if so, how long)?


@  -[No]


Were there social determinants of health that impacted care today? How? 

(Homelessness, low income, unemployed, alcoholism, drug addiction, 

transportation, low edu. Level, literacy, decrease access to med. care, prison, 

rehab)?


@  -[No]


Was there de-escalation of care discussed even if they declined (Discuss DNR or 

withdrawal of care, Hospice)? DNR status


@  -[No]


What co-morbidities impacted this encounter? (DM, HTN, Smoking, COPD, CAD, 

Cancer, CVA, ARF, Chemo, Hep., AIDS, mental health diagnosis, sleep apnea, 

morbid obesity)?


@  -[None]


Was patient admitted / discharged? Hospital course, mention meds given and 

route, prescriptions, significant lab abnormalities, going to OR and other 

pertinent info.


@   The patient is signed out pending a radiology read of the abdominal 

ultrasound.  She has had analgesic with good relief of her pain.  She is not 

having further bleeding.  We did discuss that she may not breast-feed for 6 h

ours following the morphine administration and that she must pump and then not 

use the breast milk due to risk of suppressing the neonates respiratory drive.  

She did express good understanding of this concept. (Senthil Zuniga)


Patient signed out to me pending results of ultrasound.  Presents 5 days 

postpartum with abdominal pain and some vaginal bleeding.  Not hemorrhaging.  Is

 not on blood thinners.  Describes the pain as achy.  Somewhat generalized.  

Ultrasound was obtained.  Patient responded well to pain medication.  Labs 

unremarkable.  Ultrasound revealed a clot at the cervix but no retained products

 of conception and no other abnormalities.  This was interpreted by radiology.





I reevaluated the patient and she was pain-free.  We discussed results.  She 

will follow up with OB/GYN.  She was in agreement this plan.  Strict return 

precautions discussed.





 I instructed the patient to follow up with their PCP in the next 1-3 days.  I 

explained that the patient should return to the emergency department if they 

experience any worsening symptoms. Strict return precautions were discussed with

 the patient. The patient expressed understanding of these instructions. I 

answered all questions that the patient had. The patient was discharged home in 

good condition with their prescriptions and follow up information.





Diagnosis/symptom?


@  -Abdominal pain, postpartum


Acute, or Chronic, or Acute on Chronic?


@  -Acute


Uncomplicated (without systemic symptoms) or Complicated (systemic symptoms)?


@  -Complicated


Side effects of treatment?


@  -none


Exacerbation, Progression, or Severe Exacerbation]


@  -no


Poses a threat to life or bodily function?


@  -no (Sinan Conklin)





- Lab Data


                                   Lab Results











  07/10/23 07/10/23 07/10/23 Range/Units





  23:16 23:16 23:16 


 


WBC  11.6 H    (3.8-10.6)  k/uL


 


RBC  4.28    (3.80-5.40)  m/uL


 


Hgb  12.1    (11.4-16.0)  gm/dL


 


Hct  37.8    (34.0-46.0)  %


 


MCV  88.4    (80.0-100.0)  fL


 


MCH  28.2    (25.0-35.0)  pg


 


MCHC  31.9    (31.0-37.0)  g/dL


 


RDW  13.5    (11.5-15.5)  %


 


Plt Count  297    (150-450)  k/uL


 


MPV  9.8    


 


Neutrophils %  67    %


 


Lymphocytes %  20    %


 


Monocytes %  7    %


 


Eosinophils %  3    %


 


Basophils %  1    %


 


Neutrophils #  7.8 H    (1.3-7.7)  k/uL


 


Lymphocytes #  2.3    (1.0-4.8)  k/uL


 


Monocytes #  0.8    (0-1.0)  k/uL


 


Eosinophils #  0.4    (0-0.7)  k/uL


 


Basophils #  0.1    (0-0.2)  k/uL


 


Sodium    137  (137-145)  mmol/L


 


Potassium    3.9  (3.5-5.1)  mmol/L


 


Chloride    103  ()  mmol/L


 


Carbon Dioxide    28  (22-30)  mmol/L


 


Anion Gap    6  mmol/L


 


BUN    13  (7-17)  mg/dL


 


Creatinine    0.62  (0.52-1.04)  mg/dL


 


Est GFR (CKD-EPI)AfAm    >90  (>60 ml/min/1.73 sqM)  


 


Est GFR (CKD-EPI)NonAf    >90  (>60 ml/min/1.73 sqM)  


 


Glucose    73 L  (74-99)  mg/dL


 


Plasma Lactic Acid Joo     (0.7-2.0)  mmol/L


 


Calcium    9.6  (8.4-10.2)  mg/dL


 


Total Bilirubin    0.4  (0.2-1.3)  mg/dL


 


AST    38 H  (14-36)  U/L


 


ALT    37 H  (4-34)  U/L


 


Alkaline Phosphatase    175 H  ()  U/L


 


C-Reactive Protein    3.0 H  (<1.0)  mg/dL


 


Total Protein    6.6  (6.3-8.2)  g/dL


 


Albumin    3.5  (3.5-5.0)  g/dL


 


Amylase    75  ()  U/L


 


Lipase    254  ()  U/L


 


Urine Color   Light Yellow   


 


Urine Appearance   Clear   (Clear)  


 


Urine pH   7.0   (5.0-8.0)  


 


Ur Specific Gravity   1.007   (1.001-1.035)  


 


Urine Protein   Negative   (Negative)  


 


Urine Glucose (UA)   Negative   (Negative)  


 


Urine Ketones   Negative   (Negative)  


 


Urine Blood   Large H   (Negative)  


 


Urine Nitrite   Negative   (Negative)  


 


Urine Bilirubin   Negative   (Negative)  


 


Urine Urobilinogen   <2.0   (<2.0)  mg/dL


 


Ur Leukocyte Esterase   Large H   (Negative)  


 


Urine RBC   18 H   (0-5)  /hpf


 


Urine WBC   30 H   (0-5)  /hpf


 


Urine WBC Clumps   Rare H   (None)  /hpf


 


Ur Squamous Epith Cells   1   (0-4)  /hpf


 


Urine Bacteria   Rare H   (None)  /hpf














  07/10/23 Range/Units





  23:16 


 


WBC   (3.8-10.6)  k/uL


 


RBC   (3.80-5.40)  m/uL


 


Hgb   (11.4-16.0)  gm/dL


 


Hct   (34.0-46.0)  %


 


MCV   (80.0-100.0)  fL


 


MCH   (25.0-35.0)  pg


 


MCHC   (31.0-37.0)  g/dL


 


RDW   (11.5-15.5)  %


 


Plt Count   (150-450)  k/uL


 


MPV   


 


Neutrophils %   %


 


Lymphocytes %   %


 


Monocytes %   %


 


Eosinophils %   %


 


Basophils %   %


 


Neutrophils #   (1.3-7.7)  k/uL


 


Lymphocytes #   (1.0-4.8)  k/uL


 


Monocytes #   (0-1.0)  k/uL


 


Eosinophils #   (0-0.7)  k/uL


 


Basophils #   (0-0.2)  k/uL


 


Sodium   (137-145)  mmol/L


 


Potassium   (3.5-5.1)  mmol/L


 


Chloride   ()  mmol/L


 


Carbon Dioxide   (22-30)  mmol/L


 


Anion Gap   mmol/L


 


BUN   (7-17)  mg/dL


 


Creatinine   (0.52-1.04)  mg/dL


 


Est GFR (CKD-EPI)AfAm   (>60 ml/min/1.73 sqM)  


 


Est GFR (CKD-EPI)NonAf   (>60 ml/min/1.73 sqM)  


 


Glucose   (74-99)  mg/dL


 


Plasma Lactic Acid Joo  0.8  (0.7-2.0)  mmol/L


 


Calcium   (8.4-10.2)  mg/dL


 


Total Bilirubin   (0.2-1.3)  mg/dL


 


AST   (14-36)  U/L


 


ALT   (4-34)  U/L


 


Alkaline Phosphatase   ()  U/L


 


C-Reactive Protein   (<1.0)  mg/dL


 


Total Protein   (6.3-8.2)  g/dL


 


Albumin   (3.5-5.0)  g/dL


 


Amylase   ()  U/L


 


Lipase   ()  U/L


 


Urine Color   


 


Urine Appearance   (Clear)  


 


Urine pH   (5.0-8.0)  


 


Ur Specific Gravity   (1.001-1.035)  


 


Urine Protein   (Negative)  


 


Urine Glucose (UA)   (Negative)  


 


Urine Ketones   (Negative)  


 


Urine Blood   (Negative)  


 


Urine Nitrite   (Negative)  


 


Urine Bilirubin   (Negative)  


 


Urine Urobilinogen   (<2.0)  mg/dL


 


Ur Leukocyte Esterase   (Negative)  


 


Urine RBC   (0-5)  /hpf


 


Urine WBC   (0-5)  /hpf


 


Urine WBC Clumps   (None)  /hpf


 


Ur Squamous Epith Cells   (0-4)  /hpf


 


Urine Bacteria   (None)  /hpf














Disposition





<Senthil Zuniga - Last Filed: 07/11/23 02:42>


Is patient prescribed a controlled substance at d/c from ED?: No


Time of Disposition: 03:45





<Sinan Conklin - Last Filed: 07/11/23 03:56>


Clinical Impression: 


 Abdominal pain





Disposition: HOME SELF-CARE


Condition: Good


Instructions (If sedation given, give patient instructions):  Abdominal Pain 

(ED)


Referrals: 


Nonstaff,Physician [Primary Care Provider] - 1-2 days

## 2023-07-11 NOTE — US
EXAM:

  US Pelvis Transabdominal, Complete

 

CLINICAL HISTORY:

   US Reason: RLQ pain

 

TECHNIQUE:

  Real-time complete transabdominal pelvic ultrasound with image 

documentation.

 

COMPARISON:

  No relevant prior studies available.

 

FINDINGS:

  Uterus/cervix:  There is a complex oval low density structure in the 

cervix measuring 2.6 x 2.1 x 0.7 cm.  The uterus measures 14.1 x 7.6 x 10.

7 cm consistent with postpartum status.  The endometrial stripe is 

thickened measuring 2 cm.  No myometrial mass.

  Right ovary:  The right ovary measures 2.7 x 1.6 x 3.2 cm with normal 

Doppler blood flow.

  Left ovary:  The left ovary measures 2.3 x 1.2 x 1.7 cm with normal 

Doppler blood flow.

  Free fluid:  No free fluid.

  Bladder:  Unremarkable as visualized.  Wall is normal thickness for 

degree of distention.

 

IMPRESSION:     

  There is a complex oval low density structure in the cervix measuring 2.

6 x 2.1 x 0.7 cm.  Likely small amounts of thrombus.  No abnormal Doppler 

blood flow is seen.

## 2023-11-30 ENCOUNTER — HOSPITAL ENCOUNTER (EMERGENCY)
Dept: HOSPITAL 47 - EC | Age: 28
LOS: 1 days | Discharge: OTHER INSTITUTION | End: 2023-12-01
Payer: COMMERCIAL

## 2023-11-30 VITALS — RESPIRATION RATE: 18 BRPM

## 2023-11-30 DIAGNOSIS — Z88.2: ICD-10-CM

## 2023-11-30 DIAGNOSIS — Z88.1: ICD-10-CM

## 2023-11-30 DIAGNOSIS — R00.0: ICD-10-CM

## 2023-11-30 DIAGNOSIS — F17.290: ICD-10-CM

## 2023-11-30 DIAGNOSIS — Z88.8: ICD-10-CM

## 2023-11-30 DIAGNOSIS — J45.909: ICD-10-CM

## 2023-11-30 DIAGNOSIS — Z79.899: ICD-10-CM

## 2023-11-30 DIAGNOSIS — C71.9: Primary | ICD-10-CM

## 2023-11-30 DIAGNOSIS — Z86.59: ICD-10-CM

## 2023-11-30 LAB
ALBUMIN SERPL-MCNC: 4.9 G/DL (ref 3.5–5)
ALP SERPL-CCNC: 107 U/L (ref 38–126)
ALT SERPL-CCNC: 67 U/L (ref 4–34)
ANION GAP SERPL CALC-SCNC: 15 MMOL/L
APTT BLD: 26.8 SEC (ref 22–30)
AST SERPL-CCNC: 41 U/L (ref 14–36)
BASOPHILS # BLD AUTO: 0.1 K/UL (ref 0–0.2)
BASOPHILS NFR BLD AUTO: 1 %
BUN SERPL-SCNC: 11 MG/DL (ref 7–17)
CALCIUM SPEC-MCNC: 9.9 MG/DL (ref 8.4–10.2)
CHLORIDE SERPL-SCNC: 99 MMOL/L (ref 98–107)
CK SERPL-CCNC: 197 U/L (ref 30–135)
CO2 SERPL-SCNC: 24 MMOL/L (ref 22–30)
EOSINOPHIL # BLD AUTO: 0.4 K/UL (ref 0–0.7)
EOSINOPHIL NFR BLD AUTO: 4 %
ERYTHROCYTE [DISTWIDTH] IN BLOOD BY AUTOMATED COUNT: 4.86 M/UL (ref 3.8–5.4)
ERYTHROCYTE [DISTWIDTH] IN BLOOD: 13.6 % (ref 11.5–15.5)
GLUCOSE BLD-MCNC: 93 MG/DL (ref 70–110)
GLUCOSE SERPL-MCNC: 86 MG/DL (ref 74–99)
HCT VFR BLD AUTO: 41.6 % (ref 34–46)
HGB BLD-MCNC: 13.8 GM/DL (ref 11.4–16)
INR PPP: 0.9 (ref ?–1.2)
LYMPHOCYTES # SPEC AUTO: 2.7 K/UL (ref 1–4.8)
LYMPHOCYTES NFR SPEC AUTO: 26 %
MCH RBC QN AUTO: 28.4 PG (ref 25–35)
MCHC RBC AUTO-ENTMCNC: 33.2 G/DL (ref 31–37)
MCV RBC AUTO: 85.5 FL (ref 80–100)
MONOCYTES # BLD AUTO: 0.5 K/UL (ref 0–1)
MONOCYTES NFR BLD AUTO: 5 %
NEUTROPHILS # BLD AUTO: 6.6 K/UL (ref 1.3–7.7)
NEUTROPHILS NFR BLD AUTO: 63 %
PH UR: 6.5 [PH] (ref 5–8)
PLATELET # BLD AUTO: 342 K/UL (ref 150–450)
POTASSIUM SERPL-SCNC: 4.2 MMOL/L (ref 3.5–5.1)
PROT SERPL-MCNC: 8.2 G/DL (ref 6.3–8.2)
PT BLD: 10.2 SEC (ref 10–12.5)
RBC UR QL: 1 /HPF (ref 0–5)
SODIUM SERPL-SCNC: 138 MMOL/L (ref 137–145)
SP GR UR: 1 (ref 1–1.03)
SQUAMOUS UR QL AUTO: 9 /HPF (ref 0–4)
UROBILINOGEN UR QL STRIP: <2 MG/DL (ref ?–2)
WBC # BLD AUTO: 10.4 K/UL (ref 3.8–10.6)
WBC #/AREA URNS HPF: 4 /HPF (ref 0–5)

## 2023-11-30 PROCEDURE — 80053 COMPREHEN METABOLIC PANEL: CPT

## 2023-11-30 PROCEDURE — 80306 DRUG TEST PRSMV INSTRMNT: CPT

## 2023-11-30 PROCEDURE — 85730 THROMBOPLASTIN TIME PARTIAL: CPT

## 2023-11-30 PROCEDURE — 70496 CT ANGIOGRAPHY HEAD: CPT

## 2023-11-30 PROCEDURE — 99291 CRITICAL CARE FIRST HOUR: CPT

## 2023-11-30 PROCEDURE — 70450 CT HEAD/BRAIN W/O DYE: CPT

## 2023-11-30 PROCEDURE — 36415 COLL VENOUS BLD VENIPUNCTURE: CPT

## 2023-11-30 PROCEDURE — 81001 URINALYSIS AUTO W/SCOPE: CPT

## 2023-11-30 PROCEDURE — 84484 ASSAY OF TROPONIN QUANT: CPT

## 2023-11-30 PROCEDURE — 93005 ELECTROCARDIOGRAM TRACING: CPT

## 2023-11-30 PROCEDURE — 96361 HYDRATE IV INFUSION ADD-ON: CPT

## 2023-11-30 PROCEDURE — 82550 ASSAY OF CK (CPK): CPT

## 2023-11-30 PROCEDURE — 70498 CT ANGIOGRAPHY NECK: CPT

## 2023-11-30 PROCEDURE — 96374 THER/PROPH/DIAG INJ IV PUSH: CPT

## 2023-11-30 PROCEDURE — 80320 DRUG SCREEN QUANTALCOHOLS: CPT

## 2023-11-30 PROCEDURE — 85025 COMPLETE CBC W/AUTO DIFF WBC: CPT

## 2023-11-30 PROCEDURE — 71046 X-RAY EXAM CHEST 2 VIEWS: CPT

## 2023-11-30 PROCEDURE — 85610 PROTHROMBIN TIME: CPT

## 2023-11-30 NOTE — XR
EXAM:

  XR Chest, 2 Views

 

CLINICAL HISTORY:

  ITS.REASON XR Reason: altered mental status

 

TECHNIQUE:

  Frontal and lateral views of the chest.

 

COMPARISON:

  5 /25/18

 

FINDINGS:

  Lungs:  Unremarkable.  No consolidation.

  Pleural space:  Unremarkable.  No pneumothorax.

  Heart:  Unremarkable.  No cardiomegaly.

  Mediastinum:  Unremarkable.  Normal mediastinal contour.

  Bones/joints:  Unremarkable.  No acute fracture.

 

IMPRESSION:     

  Normal chest x-rays.

## 2023-11-30 NOTE — ED
General Adult HPI





- General


Chief complaint: Neuro Symptoms/Deficit


Stated complaint: L Side Numb, Poss Stroke


Time Seen by Provider: 11/30/23 21:48


Source: patient, RN notes reviewed, old records reviewed


Mode of arrival: ambulatory


Limitations: no limitations





- History of Present Illness


Initial comments: 





Patient is a 28-year-old female presents emergency Department complaining of 

left-sided deficits from last hour and a half.  Primary complaint of left-sided 

numbness.  Also had an episode where she states that her "arm locked up."  Is 

not persistent.  Currently is only complaining of left-sided numbness.  States 

she is not under a lot of stress lately with a bad relationship as well as 

difficulty with other social aspects of her life.  Is tearful.  Has a history of

anxiety as well.  Also is concerned about a dental infection.  Denies any other 

acute complaints including denying fevers, chills, cough, chest pain.Denies any 

other significant Medical history otherwise.  Denies any alcohol or drug use.  

Presents for further evaluation at this time.  Last known well was approximately

8 PM.  No trauma.  No falls.





- Related Data


                                Home Medications











 Medication  Instructions  Recorded  Confirmed


 


Albuterol Sulfate [Ventolin HFA] 1 - 2 puff INHALATION RT-Q6H PRN 10/12/19 

11/30/23


 


Dextroamphetamine/Amphetamine 30 mg PO BID 11/30/23 11/30/23





[Adderall]   


 


Penicillin V Potassium [Pen Vee K] 500 mg PO Q6H 11/30/23 11/30/23











                                    Allergies











Allergy/AdvReac Type Severity Reaction Status Date / Time


 


methylphenidate HCl Allergy Mild Unknown Verified 11/30/23 22:17





[From Metadate CD]     


 


cephalexin [From Keflex] Allergy Unknown Rash/Hives Verified 11/30/23 22:17


 


trimethoprim [From Bactrim] Allergy Unknown Rash/Hives Verified 11/30/23 22:17


 


azithromycin Allergy  swelling Verified 11/30/23 22:17





   of mouth  


 


ciprofloxacin [From Cipro] Allergy  Rash/Hives Verified 11/30/23 22:17


 


sulfamethoxazole Allergy  Rash/Hives Verified 11/30/23 22:17





[From Bactrim]     














Review of Systems


ROS Statement: 


Those systems with pertinent positive or pertinent negative responses have been 

documented in the HPI.


Review of Systems:


CONST: Denies fever 


EYES: Denies blurry vision 


ENT: Denies nasal congestion  


C/V:  Denies Chest pain


RESP: Denies shortness of breath 


GI: Denies abdominal pain 


: Denies dysuria  


SKIN: Denies rash.


MSK: Denies joint pain.


NEURO: Endorses left sided numbness


ROS Other: All systems not noted in ROS Statement are negative.





Past Medical History


Past Medical History: Asthma


Additional Past Medical History / Comment(s): hypoglycemia, elevated liver 

enzymes


History of Any Multi-Drug Resistant Organisms: None Reported


Past Surgical History: Tonsillectomy


Additional Past Surgical History / Comment(s): oral surgery


Past Anesthesia/Blood Transfusion Reactions: No Reported Reaction


Past Psychological History: ADD/ADHD, Anxiety, Bipolar, Depression


Smoking Status: Vaper


Past Alcohol Use History: None Reported


Past Drug Use History: None Reported





- Past Family History


  ** Mother


Family Medical History: Asthma, Thyroid Disorder





General Exam





- General Exam Comments


Initial Comments: 





General: Appears extremely anxious, tearful.


HEAD:  Normal with no signs of head trauma.


EYES:  PERRLA, EOMI, conjunctiva normal, no discharge.  Pupils are 3-4 mm and 

equal bilaterally.


ENT:  Hearing grossly intact, normal oropharynx.


RESPIRATORY:  Clear breath sounds bilaterally.  No wheezes, rales, or rhonchi.  


C/V: Tachycardic with regular rhythm. S1 and S2 auscultated, no edema, 

peripheral pulses 2+ and intact throughout


ABD:  Abd is soft, nontender, nondistended


EXT: Normal range of motion, no obvious deformity


SKIN:  No rashes or lesions observed on exposed skin.


NEURO: Alert and oriented x 4.  NIH of 1 for left facial numbness and left upper

extremity numbness.  Last known well was approximately 2000.


Limitations: no limitations





Course


                                   Vital Signs











  11/30/23 11/30/23 11/30/23





  21:25 21:47 22:02


 


Temperature 98.0 F  98.1 F


 


Pulse Rate 120 H 107 H 101 H


 


Respiratory 18 18 18





Rate   


 


Blood Pressure 141/81 132/79 133/93


 


O2 Sat by Pulse 98 98 97





Oximetry   














  11/30/23 11/30/23 12/01/23





  22:17 22:32 01:15


 


Temperature 98.2 F 98.3 F 98.7 F


 


Pulse Rate 103 H 98 96


 


Respiratory 18 18 18





Rate   


 


Blood Pressure 134/89 140/95 132/88


 


O2 Sat by Pulse 98 98 99





Oximetry   














Medical Decision Making





- Medical Decision Making





Was pt. sent in by a medical professional or institution ([facility], NP, urgent c

are, hospital, or nursing home...) When possible be specific


@  -No


Did you speak to anyone other than the patient for history (EMS, parent, family,

police, friend...)? What history was obtained from this source 


@  -No


Did you review nursing and triage notes (agree or disagree)?  Why? 


@  -I reviewed and agree with nursing and triage notes


Were old charts reviewed (outside hosp., previous admission, EMS record, old 

EKG, old radiological studies, urgent care reports/EKG's, nursing home records)?

Report findings 


@  -No old charts were reviewed


Differential Diagnosis (chest pain, altered mental status, abdominal pain women,

abdominal pain men, vaginal bleeding, weakness, fever, dyspnea, syncope, 

headache, dizziness, GI bleed, back pain, seizure, CVA, palpatations, mental 

health, musculoskeletal)? 


@  -Differential CVA


Ischemic stroke, hemorrhagic stroke, brain tumor, atypical migraine, Wernicke's 

encephalopathy, seizure, multiple sclerosis, meningitis, encephalitis, 

hypoglycemia, Guillain-Barr, electrolytes disturbance, myasthenia gravis.... 

This is not meant to be an all-inclusive list


EKG interpreted by me (3pts min.).


@  -As above


X-rays interpreted by me (1pt min.).


@  -Chest x-ray shows no obvious acute cardio, process.


CT interpreted by me (1pt min.).


@  -CT brain reveals no no obvious intracranial hemorrhage or stroke.  Patient 

does have an underlying mass the right cerebellar pontine angle concerning for 

primary brain neoplasm.  CT angiogram of the brain reveals no evidence of large 

vessel occlusion.


U/S interpreted by me (1pt. min.).


@  -None done


What testing was considered but not performed or refused? (CT, X-rays, U/S, 

labs)? Why?


@  -None


What meds were considered but not given or refused? Why?


@  -None


Did you discuss the management of the patient with other professionals 

(professionals i.e. [facility], NP, lab, RT, psych nurse, , , 

teacher, , )? Give summary


@  -Discussed the stroke activation with neuro critical care Dr. Flores.  Was

in agreement with plan for imaging and no TPA as risks far outweigh benefits and

patient is very minor symptoms.





Discussed results with Dr. Ortez of neurology who recommended transfer for 

evaluation by neurosurgery.I spoke with [facility], and neurosurgeon Dr. Pierce 

accepted the patient.  I spoke with the ER attending at [facility] Dr. Murillo 

who accepted the patient.  Patient transferred in stable condition.


Was smoking cessation discussed for >3mins.?


@  -No


Was critical care preformed (if so, how long)?


@  -Yes, 40 minutes


Were there social determinants of health that impacted care today? How? 

(Homelessness, low income, unemployed, alcoholism, drug addiction, 

transportation, low edu. Level, literacy, decrease access to med. care, care home, r

ehab)?


@  -No


Was there de-escalation of care discussed even if they declined (Discuss DNR or 

withdrawal of care, Hospice)? DNR status


@  -No


What co-morbidities impacted this encounter? (DM, HTN, Smoking, COPD, CAD, 

Cancer, CVA, ARF, Chemo, Hep., AIDS, mental health diagnosis, sleep apnea, 

morbid obesity)?


@  -None


Was patient admitted / discharged? Hospital course, mention meds given and 

route, prescriptions, significant lab abnormalities, going to OR and other 

pertinent info.


@  -Based on patient's presentation and physical exam, I cannot definitively 

rule out CVA as the cause of her current symptoms.  She does have left-sided 

face and arm numbness at this time.  Last known well at 2000 with sudden onset 

of symptoms at that time.  Does appear extremely anxious however due to the 

sudden onset of symptoms we will activated the code stroke.  NIH was 1.  Patient

is not a TPA candidate as there could be other etiology for her current symptoms

as well as the fact that her deficits are minor and risks far outweigh the 

benefits.  I will discuss this with the neuro critical care physician.  Patient 

agreement this plan.  Vital signs rectal for mild tachycardia likely secondary 

to anxiety.  She'll be given IV fluids as well as a dose of IV Ativan.  Patient 

moved to trauma bay 2 after evaluated the patient in the triage giraldo.





I spoke with on-call neuro critical care Dr. Flores who was in agreement with

the plan.  Likely medical management pending results of imaging.  In agreement 

that patient is not a TPA candidate.  





CT imaging returned remarkable for a mass located at the right cerebellar 

pontine angle with calcifications inside of it.  No evidence of mass effect or 

edema.  Patient's laboratory studies returned within acceptable limits.





Patient's neuro symptoms resolved after Ativan administration.  She is resting 

comfortably at this time.NIH currently 0.  I updated the patient of the 

findings.  I spoke with our on-call neurologist, Dr. Ortez regarding the 

findings, and he recommends transfer for evaluation by neurosurgery due to the 

patient's age as well as presenting symptoms.  I spoke with the patient 

regarding this and she was in agreement this plan.  Patient like to be 

transferred to [facility].  I will contact Dr. Flores to 

update him on the results and the plan.  He was called at [phone] with no response.





I spoke with [facility], and neurosurgeon Dr. Pierce accepted the patient.  I 

spoke with the ER attending at [facility] Dr. Murillo who accepted the patient. 

Patient transferred in stable condition.


Undiagnosed new problem with uncertain prognosis?


@  -No


Drug Therapy requiring intensive monitoring for toxicity (Heparin, Nitro, 

Insulin, Cardizem)?


@  -No


Were any procedures done?


@  -No





Diagnosis/symptom?


@  -Cerebellar pontine brain tumor


Acute, or Chronic, or Acute on Chronic?


@  -Acute


Uncomplicated (without systemic symptoms) or Complicated (systemic symptoms)?


@  -Complicated


Side effects of treatment?


@  -none


Exacerbation, Progression, or Severe Exacerbation]


@  -no


Poses a threat to life or bodily function?


@  -Yes





- Lab Data


Result diagrams: 


                                 11/30/23 22:03





                                 11/30/23 22:03


                                   Lab Results











  11/30/23 11/30/23 11/30/23 Range/Units





  21:38 22:03 22:03 


 


WBC   10.4   (3.8-10.6)  k/uL


 


RBC   4.86   (3.80-5.40)  m/uL


 


Hgb   13.8   (11.4-16.0)  gm/dL


 


Hct   41.6   (34.0-46.0)  %


 


MCV   85.5   (80.0-100.0)  fL


 


MCH   28.4   (25.0-35.0)  pg


 


MCHC   33.2   (31.0-37.0)  g/dL


 


RDW   13.6   (11.5-15.5)  %


 


Plt Count   342   (150-450)  k/uL


 


MPV   8.7   


 


Neutrophils %   63   %


 


Lymphocytes %   26   %


 


Monocytes %   5   %


 


Eosinophils %   4   %


 


Basophils %   1   %


 


Neutrophils #   6.6   (1.3-7.7)  k/uL


 


Lymphocytes #   2.7   (1.0-4.8)  k/uL


 


Monocytes #   0.5   (0-1.0)  k/uL


 


Eosinophils #   0.4   (0-0.7)  k/uL


 


Basophils #   0.1   (0-0.2)  k/uL


 


PT    10.2  (10.0-12.5)  sec


 


INR    0.9  (<1.2)  


 


APTT    26.8  (22.0-30.0)  sec


 


Sodium     (137-145)  mmol/L


 


Potassium     (3.5-5.1)  mmol/L


 


Chloride     ()  mmol/L


 


Carbon Dioxide     (22-30)  mmol/L


 


Anion Gap     mmol/L


 


BUN     (7-17)  mg/dL


 


Creatinine     (0.52-1.04)  mg/dL


 


Est GFR (CKD-EPI)AfAm     (>60 ml/min/1.73 sqM)  


 


Est GFR (CKD-EPI)NonAf     (>60 ml/min/1.73 sqM)  


 


Glucose     (74-99)  mg/dL


 


POC Glucose (mg/dL)  93    ()  mg/dL


 


POC Glu Operater ID  Patricio Garcia    


 


Calcium     (8.4-10.2)  mg/dL


 


Total Bilirubin     (0.2-1.3)  mg/dL


 


AST     (14-36)  U/L


 


ALT     (4-34)  U/L


 


Alkaline Phosphatase     ()  U/L


 


Creatine Kinase     ()  U/L


 


Troponin I     (0.000-0.034)  ng/mL


 


Total Protein     (6.3-8.2)  g/dL


 


Albumin     (3.5-5.0)  g/dL


 


Urine Color     


 


Urine Appearance     (Clear)  


 


Urine pH     (5.0-8.0)  


 


Ur Specific Gravity     (1.001-1.035)  


 


Urine Protein     (Negative)  


 


Urine Glucose (UA)     (Negative)  


 


Urine Ketones     (Negative)  


 


Urine Blood     (Negative)  


 


Urine Nitrite     (Negative)  


 


Urine Bilirubin     (Negative)  


 


Urine Urobilinogen     (<2.0)  mg/dL


 


Ur Leukocyte Esterase     (Negative)  


 


Urine RBC     (0-5)  /hpf


 


Urine WBC     (0-5)  /hpf


 


Ur Squamous Epith Cells     (0-4)  /hpf


 


Amorphous Sediment     (None)  /hpf


 


Urine Bacteria     (None)  /hpf


 


Urine Mucus     (None)  /hpf


 


Urine Opiates Screen     (NotDetected)  


 


Ur Oxycodone Screen     (NotDetected)  


 


Urine Methadone Screen     (NotDetected)  


 


Ur Propoxyphene Screen     (NotDetected)  


 


Ur Barbiturates Screen     (NotDetected)  


 


U Tricyclic Antidepress     (NotDetected)  


 


Ur Phencyclidine Scrn     (NotDetected)  


 


Ur Amphetamines Screen     (NotDetected)  


 


U Methamphetamines Scrn     (NotDetected)  


 


U Benzodiazepines Scrn     (NotDetected)  


 


Urine Cocaine Screen     (NotDetected)  


 


U Marijuana (THC) Screen     (NotDetected)  


 


Serum Alcohol     mg/dL














  11/30/23 11/30/23 11/30/23 Range/Units





  22:03 22:03 22:03 


 


WBC     (3.8-10.6)  k/uL


 


RBC     (3.80-5.40)  m/uL


 


Hgb     (11.4-16.0)  gm/dL


 


Hct     (34.0-46.0)  %


 


MCV     (80.0-100.0)  fL


 


MCH     (25.0-35.0)  pg


 


MCHC     (31.0-37.0)  g/dL


 


RDW     (11.5-15.5)  %


 


Plt Count     (150-450)  k/uL


 


MPV     


 


Neutrophils %     %


 


Lymphocytes %     %


 


Monocytes %     %


 


Eosinophils %     %


 


Basophils %     %


 


Neutrophils #     (1.3-7.7)  k/uL


 


Lymphocytes #     (1.0-4.8)  k/uL


 


Monocytes #     (0-1.0)  k/uL


 


Eosinophils #     (0-0.7)  k/uL


 


Basophils #     (0-0.2)  k/uL


 


PT     (10.0-12.5)  sec


 


INR     (<1.2)  


 


APTT     (22.0-30.0)  sec


 


Sodium  138    (137-145)  mmol/L


 


Potassium  4.2    (3.5-5.1)  mmol/L


 


Chloride  99    ()  mmol/L


 


Carbon Dioxide  24    (22-30)  mmol/L


 


Anion Gap  15    mmol/L


 


BUN  11    (7-17)  mg/dL


 


Creatinine  0.78    (0.52-1.04)  mg/dL


 


Est GFR (CKD-EPI)AfAm  >90    (>60 ml/min/1.73 sqM)  


 


Est GFR (CKD-EPI)NonAf  >90    (>60 ml/min/1.73 sqM)  


 


Glucose  86    (74-99)  mg/dL


 


POC Glucose (mg/dL)     ()  mg/dL


 


POC Glu Operater ID     


 


Calcium  9.9    (8.4-10.2)  mg/dL


 


Total Bilirubin  0.7    (0.2-1.3)  mg/dL


 


AST  41 H    (14-36)  U/L


 


ALT  67 H    (4-34)  U/L


 


Alkaline Phosphatase  107    ()  U/L


 


Creatine Kinase  197 H    ()  U/L


 


Troponin I   <0.012   (0.000-0.034)  ng/mL


 


Total Protein  8.2    (6.3-8.2)  g/dL


 


Albumin  4.9    (3.5-5.0)  g/dL


 


Urine Color    Colorless  


 


Urine Appearance    Cloudy H  (Clear)  


 


Urine pH    6.5  (5.0-8.0)  


 


Ur Specific Gravity    1.001  (1.001-1.035)  


 


Urine Protein    Negative  (Negative)  


 


Urine Glucose (UA)    Negative  (Negative)  


 


Urine Ketones    Negative  (Negative)  


 


Urine Blood    Negative  (Negative)  


 


Urine Nitrite    Negative  (Negative)  


 


Urine Bilirubin    Negative  (Negative)  


 


Urine Urobilinogen    <2.0  (<2.0)  mg/dL


 


Ur Leukocyte Esterase    Negative  (Negative)  


 


Urine RBC    1  (0-5)  /hpf


 


Urine WBC    4  (0-5)  /hpf


 


Ur Squamous Epith Cells    9 H  (0-4)  /hpf


 


Amorphous Sediment    Rare H  (None)  /hpf


 


Urine Bacteria    Occasional H  (None)  /hpf


 


Urine Mucus    Rare H  (None)  /hpf


 


Urine Opiates Screen    Not Detected  (NotDetected)  


 


Ur Oxycodone Screen    Not Detected  (NotDetected)  


 


Urine Methadone Screen    Not Detected  (NotDetected)  


 


Ur Propoxyphene Screen    Not Detected  (NotDetected)  


 


Ur Barbiturates Screen    Not Detected  (NotDetected)  


 


U Tricyclic Antidepress    Not Detected  (NotDetected)  


 


Ur Phencyclidine Scrn    Not Detected  (NotDetected)  


 


Ur Amphetamines Screen    Detected H  (NotDetected)  


 


U Methamphetamines Scrn    Not Detected  (NotDetected)  


 


U Benzodiazepines Scrn    Not Detected  (NotDetected)  


 


Urine Cocaine Screen    Detected H  (NotDetected)  


 


U Marijuana (THC) Screen    Not Detected  (NotDetected)  


 


Serum Alcohol  <10    mg/dL














- EKG Data


-: EKG Interpreted by Me


EKG Comments: 





12-lead Electrocardiogram Interpretation Note





EKG was reviewed and interpreted by myself. 12-lead ECG performed at 2210 is 

interpreted by me as revealing sinus tachycardia at a rate of 100 beats per regina

te.  Axis is normal. IA Intervals 156 ms, QRS duration is 86 ms, QTc is 405 ms..

 There were no ST or T wave abnormalities to suggest myocardial ischemia or 

injury. R wave progression across the precordium was satisfactory. By my 

interpretation this EKG is non-diagnostic for acute ischemia.





Critical Care Time


Critical Care Time: Yes


Total Critical Care Time: 40





Disposition


Clinical Impression: 


 Brain tumor





Disposition: OTHER INSTITUTION NOT DEFINED


Condition: Stable


Is patient prescribed a controlled substance at d/c from ED?: No


Referrals: 


None,Stated [REFERRING] - 1-2 days


Time of Disposition: 23:15





- Out of Hospital Transfer - Req. Specs


Out of Hospital Transfer - Requested Specifics: Other Emergency Center 

(transferred to [facility] for neurosurgery evaluation.)

## 2023-11-30 NOTE — CT
EXAM:

  CT Angiography Head With Intravenous Contrast

 

CLINICAL HISTORY:

  ITS.REASON CT Reason: Neuro deficit, acute, stroke suspected

 

TECHNIQUE:

  Axial computed tomographic angiography images of the head with 

intravenous contrast.  CTDI is 8.2 mGy and DLP is 358.9 mGy-cm.  This CT 

exam was performed using one or more of the following dose reduction 

techniques: automated exposure control, adjustment of the mA and/or kV 

according to patient size, and/or use of iterative reconstruction 

technique.

  MIP reconstructed images were created and reviewed.

 

COMPARISON:

  No relevant prior studies available.

 

FINDINGS:

 

Redemonstration of a partially calcified right cerebellar pontine angle 

mass which does not enhance on the postcontrast images.

  Right internal carotid artery:  No acute findings.  Intracranial 

segment is patent with no significant stenosis.  No aneurysm.

  Right anterior cerebral artery:  Unremarkable.  No occlusion or 

significant stenosis.  No aneurysm.

  Right middle cerebral artery:  Unremarkable.  No occlusion or 

significant stenosis.  No aneurysm.

  Right posterior cerebral artery:  Unremarkable.  No occlusion or 

significant stenosis.  No aneurysm.

  Right vertebral artery:  Unremarkable as visualized.

 

  Left internal carotid artery:  No acute findings.  Intracranial segment 

is patent with no significant stenosis.  No aneurysm.

  Left anterior cerebral artery:  Unremarkable.  No occlusion or 

significant stenosis.  No aneurysm.

  Left middle cerebral artery:  Unremarkable.  No occlusion or 

significant stenosis.  No aneurysm.

  Left posterior cerebral artery:  Unremarkable.  No occlusion or 

significant stenosis.  No aneurysm.

  Left vertebral artery:  Unremarkable as visualized.

 

  Basilar artery:  Unremarkable.  No occlusion or significant stenosis.  

No aneurysm.

 

IMPRESSION:     

CT angiogram of the head negative for acute vascular abnormality

Brain with contrast

Stable appearance to partially calcified right cerebellar pontine angle 

mass.  Again postcontrast MRI of the brain would be helpful in further 

evaluation.

 

EXAM:

  CT Angiography Neck With Intravenous Contrast

 

CLINICAL HISTORY:

  ITS.REASON CT Reason: Neuro deficit, acute, stroke suspected

 

TECHNIQUE:

  Routine carotid CT angiography protocol was performed with intravenous 

contrast.  NASCET criteria using the distal ICAs for comparison were used 

for evaluation of stenoses.  CTDI is 8.2 mGy and DLP is 358.9 mGy-cm.  

This CT exam was performed using one or more of the following dose 

reduction techniques: automated exposure control, adjustment of the mA 

and/or kV according to patient size, and/or use of iterative 

reconstruction technique.

  MIP reconstructed images were created and reviewed.

 

COMPARISON:

  None.

 

FINDINGS:

 

 VASCULATURE:

  Right common carotid artery:  Unremarkable.  No occlusion or 

significant stenosis.  No dissection.

  Right internal carotid artery:  Unremarkable.  Extracranial segment is 

patent with no occlusion or significant stenosis.  No dissection.

  Right external carotid artery:  Unremarkable.  No occlusion.

  Right vertebral artery:  Unremarkable.  No occlusion or significant 

stenosis.  No dissection.

 

  Left common carotid artery:  Unremarkable.  No occlusion or significant 

stenosis.  No dissection.

  Left internal carotid artery:  Unremarkable.  Extracranial segment is 

patent with no occlusion or significant stenosis.  No dissection.

  Left external carotid artery:  Unremarkable.  No occlusion.

  Left vertebral artery:  Unremarkable.  No occlusion or significant 

stenosis.  No dissection.

 

 NECK:

  Bones/joints:  Unremarkable.  No acute fracture.

  Soft tissues:  Unremarkable.

  Lung apices:  Clear.

 

 CAROTID STENOSIS REFERENCE USING NASCET CRITERIA:

  % ICA stenosis = (1 - narrowest ICA diameter/diameter of distal 

cervical ICA) x 100.

  Mild - <50% stenosis.

  Moderate - 50-69% stenosis.

  Severe - 70-94% stenosis.

  Near occlusion - 95-99% stenosis.

  Occluded - 100% stenosis.

 

IMPRESSION:     

  Negative CTA neck.

## 2023-11-30 NOTE — CT
EXAM:

  CT Head Without Intravenous Contrast

 

CLINICAL HISTORY:

  ITS.REASON CT Reason: Neuro deficit, acute, stroke suspected

 

TECHNIQUE:

  Axial computed tomography images of the head/brain without intravenous 

contrast.  CTDI is 49.1 mGy and DLP is 1163 mGy-cm.  This CT exam was 

performed using one or more of the following dose reduction techniques: 

automated exposure control, adjustment of the mA and/or kV according to 

patient size, and/or use of iterative reconstruction technique.

 

COMPARISON:

  No relevant prior studies available.

 

FINDINGS:

  Brain:  No acute intracranial hemorrhage.  No acute transcortical 

infarct.  There is a partially calcified hypodense structure within the 

right cerebellar pontine angle extending into the right cerebellum 

measuring roughly 2.0 x 3 cm with slight extension medially into the 

vermis.  No significant white matter disease.

  Ventricles:  Unremarkable.  No ventriculomegaly.

  Bones/joints:  Unremarkable.  No acute fracture.

  Soft tissues:  Unremarkable.

  Sinuses:  Unremarkable as visualized.  No acute sinusitis.

  Mastoid air cells:  Unremarkable as visualized.  No mastoid effusion.

 

IMPRESSION:     

1.  Head CT negative for acute intracranial abnormality

2.  Underlying mass within the right cerebellar pontine angle concerning 

for primary brain neoplasm.  Postcontrast MRI of the brain recommended 

for further evaluation.

## 2023-12-01 VITALS — HEART RATE: 96 BPM | TEMPERATURE: 98.7 F | SYSTOLIC BLOOD PRESSURE: 132 MMHG | DIASTOLIC BLOOD PRESSURE: 88 MMHG

## 2023-12-16 ENCOUNTER — HOSPITAL ENCOUNTER (EMERGENCY)
Dept: HOSPITAL 47 - EC | Age: 28
Discharge: HOME | End: 2023-12-16
Payer: COMMERCIAL

## 2023-12-16 VITALS — HEART RATE: 82 BPM | SYSTOLIC BLOOD PRESSURE: 111 MMHG | TEMPERATURE: 98.7 F | DIASTOLIC BLOOD PRESSURE: 55 MMHG

## 2023-12-16 VITALS — RESPIRATION RATE: 18 BRPM

## 2023-12-16 DIAGNOSIS — F90.9: ICD-10-CM

## 2023-12-16 DIAGNOSIS — F12.90: ICD-10-CM

## 2023-12-16 DIAGNOSIS — F17.290: ICD-10-CM

## 2023-12-16 DIAGNOSIS — F31.9: ICD-10-CM

## 2023-12-16 DIAGNOSIS — Z79.899: ICD-10-CM

## 2023-12-16 DIAGNOSIS — Z88.8: ICD-10-CM

## 2023-12-16 DIAGNOSIS — F41.9: ICD-10-CM

## 2023-12-16 DIAGNOSIS — Z88.2: ICD-10-CM

## 2023-12-16 DIAGNOSIS — J45.909: ICD-10-CM

## 2023-12-16 DIAGNOSIS — Z88.1: ICD-10-CM

## 2023-12-16 DIAGNOSIS — R51.9: Primary | ICD-10-CM

## 2023-12-16 LAB
ALBUMIN SERPL-MCNC: 4.5 G/DL (ref 3.5–5)
ALP SERPL-CCNC: 81 U/L (ref 38–126)
ALT SERPL-CCNC: 566 U/L (ref 4–34)
ANION GAP SERPL CALC-SCNC: 13 MMOL/L
APTT BLD: 22.9 SEC (ref 22–30)
AST SERPL-CCNC: 116 U/L (ref 14–36)
BASOPHILS # BLD AUTO: 0.1 K/UL (ref 0–0.2)
BASOPHILS NFR BLD AUTO: 0 %
BUN SERPL-SCNC: 21 MG/DL (ref 7–17)
CALCIUM SPEC-MCNC: 9.6 MG/DL (ref 8.4–10.2)
CHLORIDE SERPL-SCNC: 97 MMOL/L (ref 98–107)
CO2 SERPL-SCNC: 26 MMOL/L (ref 22–30)
EOSINOPHIL # BLD AUTO: 0 K/UL (ref 0–0.7)
EOSINOPHIL NFR BLD AUTO: 0 %
ERYTHROCYTE [DISTWIDTH] IN BLOOD BY AUTOMATED COUNT: 4.71 M/UL (ref 3.8–5.4)
ERYTHROCYTE [DISTWIDTH] IN BLOOD: 14.2 % (ref 11.5–15.5)
GLUCOSE SERPL-MCNC: 101 MG/DL (ref 74–99)
HCT VFR BLD AUTO: 41 % (ref 34–46)
HGB BLD-MCNC: 13.3 GM/DL (ref 11.4–16)
INR PPP: 0.8 (ref ?–1.2)
LYMPHOCYTES # SPEC AUTO: 1.2 K/UL (ref 1–4.8)
LYMPHOCYTES NFR SPEC AUTO: 7 %
MAGNESIUM SPEC-SCNC: 2 MG/DL (ref 1.6–2.3)
MCH RBC QN AUTO: 28.3 PG (ref 25–35)
MCHC RBC AUTO-ENTMCNC: 32.5 G/DL (ref 31–37)
MCV RBC AUTO: 87 FL (ref 80–100)
MONOCYTES # BLD AUTO: 0.8 K/UL (ref 0–1)
MONOCYTES NFR BLD AUTO: 5 %
NEUTROPHILS # BLD AUTO: 14.6 K/UL (ref 1.3–7.7)
NEUTROPHILS NFR BLD AUTO: 87 %
PH UR: 7.5 [PH] (ref 5–8)
PLATELET # BLD AUTO: 293 K/UL (ref 150–450)
POTASSIUM SERPL-SCNC: 4.3 MMOL/L (ref 3.5–5.1)
PROT SERPL-MCNC: 7.5 G/DL (ref 6.3–8.2)
PT BLD: 9.6 SEC (ref 10–12.5)
RBC UR QL: <1 /HPF (ref 0–5)
SODIUM SERPL-SCNC: 136 MMOL/L (ref 137–145)
SP GR UR: 1.01 (ref 1–1.03)
SQUAMOUS UR QL AUTO: <1 /HPF (ref 0–4)
UROBILINOGEN UR QL STRIP: <2 MG/DL (ref ?–2)
WBC # BLD AUTO: 16.9 K/UL (ref 3.8–10.6)

## 2023-12-16 PROCEDURE — 85610 PROTHROMBIN TIME: CPT

## 2023-12-16 PROCEDURE — 80053 COMPREHEN METABOLIC PANEL: CPT

## 2023-12-16 PROCEDURE — 36415 COLL VENOUS BLD VENIPUNCTURE: CPT

## 2023-12-16 PROCEDURE — 70450 CT HEAD/BRAIN W/O DYE: CPT

## 2023-12-16 PROCEDURE — 83735 ASSAY OF MAGNESIUM: CPT

## 2023-12-16 PROCEDURE — 96361 HYDRATE IV INFUSION ADD-ON: CPT

## 2023-12-16 PROCEDURE — 81001 URINALYSIS AUTO W/SCOPE: CPT

## 2023-12-16 PROCEDURE — 85730 THROMBOPLASTIN TIME PARTIAL: CPT

## 2023-12-16 PROCEDURE — 85025 COMPLETE CBC W/AUTO DIFF WBC: CPT

## 2023-12-16 PROCEDURE — 99285 EMERGENCY DEPT VISIT HI MDM: CPT

## 2023-12-16 PROCEDURE — 84100 ASSAY OF PHOSPHORUS: CPT

## 2023-12-16 PROCEDURE — 96375 TX/PRO/DX INJ NEW DRUG ADDON: CPT

## 2023-12-16 PROCEDURE — 96374 THER/PROPH/DIAG INJ IV PUSH: CPT

## 2023-12-16 PROCEDURE — 84484 ASSAY OF TROPONIN QUANT: CPT

## 2023-12-16 NOTE — CT
EXAMINATION TYPE: CT brain wo con

 

DATE OF EXAM: 12/16/2023

 

COMPARISON: 11/30/2023

 

HISTORY: headache

 

CT DLP: 1035.1 mGycm.  Automated Exposure Control for Dose Reduction was Utilized.

 

 

TECHNIQUE: CT scan of the head is performed without contrast.

 

FINDINGS:

 

There has been no interval change in the ill-defined area of hypodensity with scattered dense calcifi
cations involving the right cerebellar hemisphere, vermis and a portion of the left cerebellar hemisp
here. The finding is highly suspicious for primary neoplasm. MRI of the brain with and without contra
st is recommended for further evaluation. The ventricles, basal cisterns and sulci over the convexiti
es are within normal limits and there is no shift of the midline structures.

 

There is no acute intra or extra-axial hemorrhage.

 

The intraorbital contents appear normal and symmetric.

 

Visualized paranasal sinuses and mastoid air cells are well aerated.

 

IMPRESSION:

 

No change in the mass with calcification in the cerebellum. Findings highly suspicious for primary ne
oplasm and MRI of the brain with and without contrast is recommended for further evaluation. There is
 no acute bleed, hydrocephalus or shift of the midline structures.

## 2023-12-16 NOTE — ED
Recheck HPI





- General


Chief Complaint: Headache


Stated Complaint: headache


Time Seen by Provider: 12/16/23 13:38


Source: patient, RN notes reviewed, old records reviewed, Caregiver


Mode of arrival: ambulatory


Limitations: no limitations





- History of Present Illness


Initial Comments: 





This is a 28-year-old female presenting under care of her mom, patient comes in 

for evaluation of headache,.  Recent medical history obtained from chart as well

as prior transfer records, patient was transferred outside facility where she 

was treated at length the diagnosis of brain lesions versus new diagnosis of 

impending seizures versus CVA and stroke and altered mental status.  Patient 

states he is just coming off steroids currently and states she's been feeling 

unwell with that change.  She just was at work today and not feeling well


Returns Today for: persistent/worsening pain related to initial visit


Symptoms Since Prior Visit: worsening pain


Associated Symptoms: nausea


Treatments Prior to Arrival: Given Pain Meds on





- Related Data


                                Home Medications











 Medication  Instructions  Recorded  Confirmed


 


Albuterol Sulfate [Ventolin HFA] 1 - 2 puff INHALATION RT-Q6H PRN 10/12/19 

12/16/23


 


Dextroamphetamine/Amphetamine 30 mg PO BID 11/30/23 12/16/23





[Adderall]   


 


Clotrimazole/Betameth Cream 1 applic TOPICAL BID 12/16/23 12/16/23





[Lotrisone]   


 


Ibuprofen [Motrin] 600 mg PO Q6HR PRN 12/16/23 12/16/23


 


dexAMETHasone [Decadron] 2 mg PO TID@1000,1800,0000 12/16/23 12/16/23


 


diazePAM [Diazepam] 2 mg PO BID 12/16/23 12/16/23











                                    Allergies











Allergy/AdvReac Type Severity Reaction Status Date / Time


 


methylphenidate HCl Allergy Mild Unknown Verified 12/16/23 14:24





[From Metadate CD]     


 


cephalexin [From Keflex] Allergy Unknown Rash/Hives Verified 12/16/23 14:24


 


trimethoprim [From Bactrim] Allergy Unknown Rash/Hives Verified 12/16/23 14:24


 


azithromycin Allergy  swelling Verified 12/16/23 14:24





   of mouth  


 


ciprofloxacin [From Cipro] Allergy  Rash/Hives Verified 12/16/23 14:24


 


sulfamethoxazole Allergy  Rash/Hives Verified 12/16/23 14:24





[From Bactrim]     














Review of Systems


ROS Statement: 


Those systems with pertinent positive or pertinent negative responses have been 

documented in the HPI.





ROS Other: All systems not noted in ROS Statement are negative.





Past Medical History


Past Medical History: Asthma


Additional Past Medical History / Comment(s): hypoglycemia, elevated liver 

enzymes, brain lesions


History of Any Multi-Drug Resistant Organisms: None Reported


Past Surgical History: Tonsillectomy


Additional Past Surgical History / Comment(s): oral surgery


Past Anesthesia/Blood Transfusion Reactions: No Reported Reaction


Past Psychological History: ADD/ADHD, Anxiety, Bipolar, Depression


Smoking Status: Vaper


Past Alcohol Use History: Occasional


Past Drug Use History: None Reported, Marijuana





- Past Family History


  ** Mother


Family Medical History: Asthma, Thyroid Disorder





General Exam


Limitations: no limitations


General appearance: alert, in no apparent distress


Head exam: Present: atraumatic, normocephalic, normal inspection


Eye exam: Present: normal appearance, PERRL, EOMI.  Absent: scleral icterus, 

conjunctival injection, periorbital swelling


ENT exam: Present: normal exam, mucous membranes moist


Neck exam: Present: normal inspection.  Absent: tenderness, meningismus, 

lymphadenopathy


Respiratory exam: Present: normal lung sounds bilaterally.  Absent: respiratory 

distress, wheezes, rales, rhonchi, stridor


Cardiovascular Exam: Present: regular rate, normal rhythm, normal heart sounds. 

Absent: systolic murmur, diastolic murmur, rubs, gallop, clicks


GI/Abdominal exam: Present: soft, normal bowel sounds.  Absent: distended, 

tenderness, guarding, rebound, rigid


Extremities exam: Present: normal inspection, full ROM, normal capillary refill.

 Absent: tenderness, pedal edema, joint swelling, calf tenderness


Back exam: Present: normal inspection


Neurological exam: Present: alert, oriented X3, CN II-XII intact


Psychiatric exam: Present: normal affect, normal mood


Skin exam: Present: warm, dry, intact, normal color.  Absent: rash





Course


                                   Vital Signs











  12/16/23 12/16/23 12/16/23





  13:15 14:31 15:44


 


Temperature 98.4 F  98.7 F


 


Pulse Rate 117 H 105 H 82


 


Respiratory 20 18 18





Rate   


 


Blood Pressure 132/70 120/103 111/55


 


O2 Sat by Pulse 98 98 98





Oximetry   














- Reevaluation(s)


Reevaluation #1: 





Medical record is reviewed


Reevaluation #2: 





Patient symptoms are improving here in the ER


Reevaluation #3: 





Patient informed results and questions answered


Reevaluation #4: 





12/16/23 15:01


Was pt. sent in by a medical professional or institution (, PA, NP, urgent 

care, hospital, or nursing home...) When possible be specific


@  -no


Did you speak to anyone other than the patient for history (EMS, parent, family,

police, friend...)? What history was obtained from this source 


@  -no


Did you review nursing and triage notes (agree or disagree)?  Why? 


@  -agree


Are old charts reviewed (outside hosp., previous admission, EMS record, old EKG,

old radiological studies, urgent care reports/EKG's, nursing home records)? R

eport findings 


@  -yes


Differential Diagnosis (chest pain, altered mental status, abdominal pain women,

abdominal pain men, vaginal bleeding, weakness, fever, dyspnea, syncope, 

headache, dizziness, GI bleed, back pain, seizure, CVA, palpatations, mental 

health, musculoskeletal)? 


@  -prior


EKG interpreted by me (3pts min.).


@  -no


X-rays interpreted by me (1pt min.).


@  -no


CT interpreted by me (1pt min.).


@  -yes negative for acute disease


U/S interpreted by me (1pt. min.).


@  -no


What testing was considered but not performed or refused? (CT, X-rays, U/S, 

labs)? Why?


@  -none


What meds were considered but not given or refused? Why?


@  -none


Did you discuss the management of the patient with other professionals 

(professionals i.e. , PA, NP, lab, RT, psych nurse, , , 

teacher, , )? Give summary


@  -no


Was smoking cessation discussed for >3mins.?


@  -no


Was critical care preformed (if so, how long)?


@  -no


Were there social determinants of health that impacted care today? How? 

(Homelessness, low income, unemployed, alcoholism, drug addiction, 

transportation, low edu. Level, literacy, decrease access to med. care, intermediate, 

rehab)?


@  -none


Was there de-escalation of care discussed even if they declined (Discuss DNR or 

withdrawal of care, Hospice)? DNR status


@  -no


What co-morbidities impacted this encounter? (DM, HTN, Smoking, COPD, CAD, 

Cancer, CVA, ARF, Chemo, Hep., AIDS, mental health diagnosis, sleep apnea, 

morbid obesity)?


@  -none


Was patient admitted / discharged? Hospital course, mention meds given and 

route, prescriptions, significant lab abnormalities, going to OR and other 

pertinent info.


@  - 28 female to the emergency department for evaluation of headache and head 

pain.  Patient is concern for change in computed tomography scan with recent 

hospital admission and evaluation.  Patient states her symptoms are improved.  

Does not want further hospital evaluation or to be sent back to treating 

hospital, patient prefers discharged home


Discharged


Undiagnosed new problem with uncertain prognosis?


@  -no


Drug Therapy requiring intensive monitoring for toxicity (Heparin, Nitro, 

Insulin, Cardizem)?


@  -no


Were any procedures done?


@  -no


Diagnosis/symptom?


@  -Headache, recurrent headache recent brain lesion found


Acute, or Chronic, or Acute on Chronic?


@  -Acute


Uncomplicated (without systemic symptoms) or Complicated (systemic symptoms)?


@  -Complicated


Side effects of treatment?


@  -no


Exacerbation, Progression, or Severe Exacerbation?


@  -exacerbation


Poses a threat to life or bodily function? How? (Chest pain, USA, MI, pneumonia,

PE, COPD, DKA, ARF, appy, cholecystitis, CVA, Diverticulitis, Homicidal, 

Suicidal, threat to staff... and all critical care pts)


@  -yes with significant recent history of brain issue with tumor





Reevaluation #5: 





Differential Headache:


Migraine, tension, cluster, carbon monoxide, central venous thrombosis, pension 

karma temporal arteritis, acute closure glaucoma, intercranial hemorrhage, 

mastoiditis, sinusitis, head injury, this is not meant to be an all-inclusive 

list.





Medical Decision Making





- Medical Decision Making





28 female to the emergency department for evaluation of headache and head pain. 

Patient is concern for change in computed tomography scan with recent hospital 

admission and evaluation.  Patient states her symptoms are improved.  Does not 

want further hospital evaluation or to be sent back to treating hospital, 

patient prefers discharged home





- Lab Data


Result diagrams: 


                                 12/16/23 14:17





                                 12/16/23 14:17


                                   Lab Results











  12/16/23 12/16/23 12/16/23 Range/Units





  14:17 14:17 14:17 


 


WBC  16.9 H    (3.8-10.6)  k/uL


 


RBC  4.71    (3.80-5.40)  m/uL


 


Hgb  13.3    (11.4-16.0)  gm/dL


 


Hct  41.0    (34.0-46.0)  %


 


MCV  87.0    (80.0-100.0)  fL


 


MCH  28.3    (25.0-35.0)  pg


 


MCHC  32.5    (31.0-37.0)  g/dL


 


RDW  14.2    (11.5-15.5)  %


 


Plt Count  293    (150-450)  k/uL


 


MPV  9.7    


 


Neutrophils %  87    %


 


Lymphocytes %  7    %


 


Monocytes %  5    %


 


Eosinophils %  0    %


 


Basophils %  0    %


 


Neutrophils #  14.6 H    (1.3-7.7)  k/uL


 


Lymphocytes #  1.2    (1.0-4.8)  k/uL


 


Monocytes #  0.8    (0-1.0)  k/uL


 


Eosinophils #  0.0    (0-0.7)  k/uL


 


Basophils #  0.1    (0-0.2)  k/uL


 


PT   9.6 L   (10.0-12.5)  sec


 


INR   0.8   (<1.2)  


 


APTT   22.9   (22.0-30.0)  sec


 


Sodium     (137-145)  mmol/L


 


Potassium     (3.5-5.1)  mmol/L


 


Chloride     ()  mmol/L


 


Carbon Dioxide     (22-30)  mmol/L


 


Anion Gap     mmol/L


 


BUN     (7-17)  mg/dL


 


Creatinine     (0.52-1.04)  mg/dL


 


Est GFR (CKD-EPI)AfAm     (>60 ml/min/1.73 sqM)  


 


Est GFR (CKD-EPI)NonAf     (>60 ml/min/1.73 sqM)  


 


Glucose     (74-99)  mg/dL


 


Calcium     (8.4-10.2)  mg/dL


 


Phosphorus     (2.5-4.5)  mg/dL


 


Magnesium     (1.6-2.3)  mg/dL


 


Total Bilirubin     (0.2-1.3)  mg/dL


 


AST     (14-36)  U/L


 


ALT     (4-34)  U/L


 


Alkaline Phosphatase     ()  U/L


 


Troponin I     (0.000-0.034)  ng/mL


 


Total Protein     (6.3-8.2)  g/dL


 


Albumin     (3.5-5.0)  g/dL


 


Urine Color    Colorless  


 


Urine Appearance    Clear  (Clear)  


 


Urine pH    7.5  (5.0-8.0)  


 


Ur Specific Gravity    1.008  (1.001-1.035)  


 


Urine Protein    Negative  (Negative)  


 


Urine Glucose (UA)    Negative  (Negative)  


 


Urine Ketones    Negative  (Negative)  


 


Urine Blood    Moderate H  (Negative)  


 


Urine Nitrite    Negative  (Negative)  


 


Urine Bilirubin    Negative  (Negative)  


 


Urine Urobilinogen    <2.0  (<2.0)  mg/dL


 


Ur Leukocyte Esterase    Trace H  (Negative)  


 


Urine RBC    <1  (0-5)  /hpf


 


Ur Squamous Epith Cells    <1  (0-4)  /hpf














  12/16/23 12/16/23 Range/Units





  14:17 14:17 


 


WBC    (3.8-10.6)  k/uL


 


RBC    (3.80-5.40)  m/uL


 


Hgb    (11.4-16.0)  gm/dL


 


Hct    (34.0-46.0)  %


 


MCV    (80.0-100.0)  fL


 


MCH    (25.0-35.0)  pg


 


MCHC    (31.0-37.0)  g/dL


 


RDW    (11.5-15.5)  %


 


Plt Count    (150-450)  k/uL


 


MPV    


 


Neutrophils %    %


 


Lymphocytes %    %


 


Monocytes %    %


 


Eosinophils %    %


 


Basophils %    %


 


Neutrophils #    (1.3-7.7)  k/uL


 


Lymphocytes #    (1.0-4.8)  k/uL


 


Monocytes #    (0-1.0)  k/uL


 


Eosinophils #    (0-0.7)  k/uL


 


Basophils #    (0-0.2)  k/uL


 


PT    (10.0-12.5)  sec


 


INR    (<1.2)  


 


APTT    (22.0-30.0)  sec


 


Sodium  136 L   (137-145)  mmol/L


 


Potassium  4.3   (3.5-5.1)  mmol/L


 


Chloride  97 L   ()  mmol/L


 


Carbon Dioxide  26   (22-30)  mmol/L


 


Anion Gap  13   mmol/L


 


BUN  21 H   (7-17)  mg/dL


 


Creatinine  0.63   (0.52-1.04)  mg/dL


 


Est GFR (CKD-EPI)AfAm  >90   (>60 ml/min/1.73 sqM)  


 


Est GFR (CKD-EPI)NonAf  >90   (>60 ml/min/1.73 sqM)  


 


Glucose  101 H   (74-99)  mg/dL


 


Calcium  9.6   (8.4-10.2)  mg/dL


 


Phosphorus  3.7   (2.5-4.5)  mg/dL


 


Magnesium  2.0   (1.6-2.3)  mg/dL


 


Total Bilirubin  0.6   (0.2-1.3)  mg/dL


 


AST  116 H   (14-36)  U/L


 


ALT  566 H   (4-34)  U/L


 


Alkaline Phosphatase  81   ()  U/L


 


Troponin I   <0.012  (0.000-0.034)  ng/mL


 


Total Protein  7.5   (6.3-8.2)  g/dL


 


Albumin  4.5   (3.5-5.0)  g/dL


 


Urine Color    


 


Urine Appearance    (Clear)  


 


Urine pH    (5.0-8.0)  


 


Ur Specific Gravity    (1.001-1.035)  


 


Urine Protein    (Negative)  


 


Urine Glucose (UA)    (Negative)  


 


Urine Ketones    (Negative)  


 


Urine Blood    (Negative)  


 


Urine Nitrite    (Negative)  


 


Urine Bilirubin    (Negative)  


 


Urine Urobilinogen    (<2.0)  mg/dL


 


Ur Leukocyte Esterase    (Negative)  


 


Urine RBC    (0-5)  /hpf


 


Ur Squamous Epith Cells    (0-4)  /hpf














- Radiology Data


Radiology results: report reviewed (CT brain is negative for acute disease), 

image reviewed





Disposition


Clinical Impression: 


 Headache





Disposition: HOME SELF-CARE


Condition: Good


Instructions (If sedation given, give patient instructions):  Acute Headache 

(ED)


Is patient prescribed a controlled substance at d/c from ED?: No


Referrals: 


Senthil Medina MD [Primary Care Provider] - 1-2 days


Time of Disposition: 15:10

## 2024-01-02 ENCOUNTER — HOSPITAL ENCOUNTER (EMERGENCY)
Dept: HOSPITAL 47 - EC | Age: 29
Discharge: HOME | End: 2024-01-02
Payer: COMMERCIAL

## 2024-01-02 VITALS — DIASTOLIC BLOOD PRESSURE: 87 MMHG | SYSTOLIC BLOOD PRESSURE: 142 MMHG | RESPIRATION RATE: 20 BRPM | TEMPERATURE: 97.8 F

## 2024-01-02 VITALS — HEART RATE: 94 BPM

## 2024-01-02 DIAGNOSIS — F17.290: ICD-10-CM

## 2024-01-02 DIAGNOSIS — Z20.822: ICD-10-CM

## 2024-01-02 DIAGNOSIS — J45.909: ICD-10-CM

## 2024-01-02 DIAGNOSIS — K04.7: Primary | ICD-10-CM

## 2024-01-02 DIAGNOSIS — F12.90: ICD-10-CM

## 2024-01-02 DIAGNOSIS — B97.4: ICD-10-CM

## 2024-01-02 DIAGNOSIS — Z88.8: ICD-10-CM

## 2024-01-02 DIAGNOSIS — G93.89: ICD-10-CM

## 2024-01-02 DIAGNOSIS — R00.0: ICD-10-CM

## 2024-01-02 DIAGNOSIS — Z88.2: ICD-10-CM

## 2024-01-02 DIAGNOSIS — F41.9: ICD-10-CM

## 2024-01-02 DIAGNOSIS — F90.9: ICD-10-CM

## 2024-01-02 DIAGNOSIS — Z88.1: ICD-10-CM

## 2024-01-02 LAB
ALBUMIN SERPL-MCNC: 3.8 G/DL (ref 3.5–5)
ALP SERPL-CCNC: 65 U/L (ref 38–126)
ALT SERPL-CCNC: 112 U/L (ref 4–34)
ANION GAP SERPL CALC-SCNC: 12 MMOL/L
AST SERPL-CCNC: 28 U/L (ref 14–36)
BASOPHILS # BLD AUTO: 0 K/UL (ref 0–0.2)
BASOPHILS NFR BLD AUTO: 0 %
BUN SERPL-SCNC: 18 MG/DL (ref 7–17)
CALCIUM SPEC-MCNC: 8.5 MG/DL (ref 8.4–10.2)
CHLORIDE SERPL-SCNC: 102 MMOL/L (ref 98–107)
CO2 SERPL-SCNC: 23 MMOL/L (ref 22–30)
EOSINOPHIL # BLD AUTO: 0 K/UL (ref 0–0.7)
EOSINOPHIL NFR BLD AUTO: 0 %
ERYTHROCYTE [DISTWIDTH] IN BLOOD BY AUTOMATED COUNT: 4.36 M/UL (ref 3.8–5.4)
ERYTHROCYTE [DISTWIDTH] IN BLOOD: 15.1 % (ref 11.5–15.5)
GLUCOSE SERPL-MCNC: 143 MG/DL (ref 74–99)
HCT VFR BLD AUTO: 38.6 % (ref 34–46)
HGB BLD-MCNC: 12.6 GM/DL (ref 11.4–16)
LYMPHOCYTES # SPEC AUTO: 1.5 K/UL (ref 1–4.8)
LYMPHOCYTES NFR SPEC AUTO: 11 %
MAGNESIUM SPEC-SCNC: 1.8 MG/DL (ref 1.6–2.3)
MCH RBC QN AUTO: 28.9 PG (ref 25–35)
MCHC RBC AUTO-ENTMCNC: 32.6 G/DL (ref 31–37)
MCV RBC AUTO: 88.7 FL (ref 80–100)
MONOCYTES # BLD AUTO: 0.8 K/UL (ref 0–1)
MONOCYTES NFR BLD AUTO: 6 %
NEUTROPHILS # BLD AUTO: 11.2 K/UL (ref 1.3–7.7)
NEUTROPHILS NFR BLD AUTO: 82 %
PLATELET # BLD AUTO: 265 K/UL (ref 150–450)
POTASSIUM SERPL-SCNC: 3.6 MMOL/L (ref 3.5–5.1)
PROT SERPL-MCNC: 6.3 G/DL (ref 6.3–8.2)
SODIUM SERPL-SCNC: 137 MMOL/L (ref 137–145)
WBC # BLD AUTO: 13.7 K/UL (ref 3.8–10.6)

## 2024-01-02 PROCEDURE — 93005 ELECTROCARDIOGRAM TRACING: CPT

## 2024-01-02 PROCEDURE — 80053 COMPREHEN METABOLIC PANEL: CPT

## 2024-01-02 PROCEDURE — 83735 ASSAY OF MAGNESIUM: CPT

## 2024-01-02 PROCEDURE — 83605 ASSAY OF LACTIC ACID: CPT

## 2024-01-02 PROCEDURE — 85025 COMPLETE CBC W/AUTO DIFF WBC: CPT

## 2024-01-02 PROCEDURE — 96374 THER/PROPH/DIAG INJ IV PUSH: CPT

## 2024-01-02 PROCEDURE — 99285 EMERGENCY DEPT VISIT HI MDM: CPT

## 2024-01-02 PROCEDURE — 96361 HYDRATE IV INFUSION ADD-ON: CPT

## 2024-01-02 PROCEDURE — 70450 CT HEAD/BRAIN W/O DYE: CPT

## 2024-01-02 PROCEDURE — 96375 TX/PRO/DX INJ NEW DRUG ADDON: CPT

## 2024-01-02 PROCEDURE — 87636 SARSCOV2 & INF A&B AMP PRB: CPT

## 2024-01-02 PROCEDURE — 36415 COLL VENOUS BLD VENIPUNCTURE: CPT

## 2024-01-02 NOTE — ED
General Adult HPI





- General


Chief complaint: Seizure


Stated complaint: Seizure,SOB


Time Seen by Provider: 01/02/24 02:11


Source: patient, RN notes reviewed, old records reviewed


Mode of arrival: wheelchair


Limitations: no limitations





- History of Present Illness


Initial comments: 





28-year-old female presenting with multiple complaints including evaluation of 

tooth abscess.  Patient states that she had a draining abscess in her right 

upper molar earlier today.  She has multiple additional complaints including 

feeling like she may have a seizure.  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 

stress-induced seizures.  She had her recent CT imaging which showed calcified 

intracranial mass and was transferred to [facility] where she received 

evaluation.  She is currently awaiting second evaluation at [facility].

 She reports cough, congestion in addition.





- Related Data


                                Home Medications











 Medication  Instructions  Recorded  Confirmed


 


Albuterol Sulfate [Ventolin HFA] 1 - 2 puff INHALATION RT-Q6H PRN 10/12/19 

12/16/23


 


Dextroamphetamine/Amphetamine 30 mg PO BID 11/30/23 12/16/23





[Adderall]   


 


Clotrimazole/Betameth Cream 1 applic TOPICAL BID 12/16/23 12/16/23





[Lotrisone]   


 


Ibuprofen [Motrin] 600 mg PO Q6HR PRN 12/16/23 12/16/23


 


dexAMETHasone [Decadron] 2 mg PO TID@1000,1800,0000 12/16/23 12/16/23


 


diazePAM [Diazepam] 2 mg PO BID 12/16/23 12/16/23








                                  Previous Rx's











 Medication  Instructions  Recorded


 


Amoxic-Pot Clav 875-125Mg 1 tab PO Q12HR 10 Days #20 tab 01/02/24





[Augmentin 875-125]  











                                    Allergies











Allergy/AdvReac Type Severity Reaction Status Date / Time


 


methylphenidate HCl Allergy Mild Unknown Verified 01/02/24 02:10





[From Metadate CD]     


 


cephalexin [From Keflex] Allergy Unknown Rash/Hives Verified 01/02/24 02:10


 


trimethoprim [From Bactrim] Allergy Unknown Rash/Hives Verified 01/02/24 02:10


 


azithromycin Allergy  swelling Verified 01/02/24 02:10





   of mouth  


 


ciprofloxacin [From Cipro] Allergy  Rash/Hives Verified 01/02/24 02:10


 


ketorolac [From Toradol] Allergy  Unknown Verified 01/02/24 03:04


 


sulfamethoxazole Allergy  Rash/Hives Verified 01/02/24 02:10





[From Bactrim]     














Review of Systems


ROS Statement: 


Those systems with pertinent positive or pertinent negative responses have been 

documented in the HPI.





ROS Other: All systems not noted in ROS Statement are negative.





Past Medical History


Past Medical History: Asthma


Additional Past Medical History / Comment(s): hypoglycemia, elevated liver 

enzymes, brain lesions


History of Any Multi-Drug Resistant Organisms: None Reported


Past Surgical History: Tonsillectomy


Additional Past Surgical History / Comment(s): oral surgery


Past Anesthesia/Blood Transfusion Reactions: No Reported Reaction


Past Psychological History: ADD/ADHD, Anxiety, Bipolar, Depression


Smoking Status: Vaper


Past Alcohol Use History: Occasional


Past Drug Use History: Marijuana





- Past Family History


  ** Mother


Family Medical History: Asthma, Thyroid Disorder





General Exam


Limitations: no limitations


General appearance: alert, in no apparent distress


Head exam: Present: atraumatic, normocephalic


Eye exam: Present: normal appearance, PERRL


ENT exam: Present: other (poor Dentition, apical abscess right upper molar)


Respiratory exam: Present: normal lung sounds bilaterally.  Absent: respiratory 

distress, wheezes


Cardiovascular Exam: Present: normal rhythm, tachycardia


GI/Abdominal exam: Present: soft.  Absent: distended, tenderness


Neurological exam: Present: alert, oriented X3, CN II-XII intact.  Absent: motor

sensory deficit


Skin exam: Present: warm, dry, intact.  Absent: cyanosis, diaphoretic





Course


                                   Vital Signs











  01/02/24 01/02/24 01/02/24





  02:07 02:46 03:41


 


Temperature 97.8 F  


 


Pulse Rate 123 H 102 H 94


 


Respiratory 20  20





Rate   


 


Blood Pressure 142/87  


 


O2 Sat by Pulse 99  98





Oximetry   














Medical Decision Making





- Medical Decision Making





Was pt. sent in by a medical professional or institution (, PA, NP, urgent 

care, hospital, or nursing home...) When possible be specific


@  -[No]


Did you speak to anyone other than the patient for history (EMS, parent, family,

 police, friend...)? What history was obtained from this source 


@  -[No]


Did you review nursing and triage notes (agree or disagree)?  Why? 


@  -[I reviewed and agree with nursing and triage notes]


Were old charts reviewed (outside hosp., previous admission, EMS record, old 

EKG, old radiological studies, urgent care reports/EKG's, nursing home records)?

Report findings 


@  -[No old charts were reviewed]


Differential Diagnosis (chest pain, altered mental status, abdominal pain women,

abdominal pain men, vaginal bleeding, weakness, fever, dyspnea, syncope, 

headache, dizziness, GI bleed, back pain, seizure, CVA, palpatations, mental 

health, musculoskeletal)? 


@  -Viral infection, headache associated


EKG interpreted by me (3pts min.).


@  -Sinus tachycardia rate of 118, IA interval 128, QRS duration 85, , no

ST segment elevation, similar QRS morphology compared to prior.


X-rays interpreted by me (1pt min.).


@  -[None done]


CT interpreted by me (1pt min.).


@  -[CT brain, showing a stable calcified mass without acute change.


U/S interpreted by me (1pt. min.).


@  -[None done]


What testing was considered but not performed or refused? (CT, X-rays, U/S, l

abs)? Why?


@  -[None]


What meds were considered but not given or refused? Why?


@  -[None]


Did you discuss the management of the patient with other professionals 

(professionals i.e. , PA, NP, lab, RT, psych nurse, , , 

teacher, , )? Give summary


@  -[No]


Was smoking cessation discussed for >3mins.?


@  -[No]


Was critical care preformed (if so, how long)?


@  -[No]


Were there social determinants of health that impacted care today? How? 

(Homelessness, low income, unemployed, alcoholism, drug addiction, 

transportation, low edu. Level, literacy, decrease access to med. care, assisted, 

rehab)?


@  -[No]


Was there de-escalation of care discussed even if they declined (Discuss DNR or 

withdrawal of care, Hospice)? DNR status


@  -[No]


What co-morbidities impacted this encounter? (DM, HTN, Smoking, COPD, CAD, 

Cancer, CVA, ARF, Chemo, Hep., AIDS, mental health diagnosis, sleep apnea, 

morbid obesity)?


@  -[None]


Was patient admitted / discharged? Hospital course, mention meds given and 

route, prescriptions, significant lab abnormalities, going to OR and other p

ertinent info.


@  Patient presenting with multiple complaints including toothache, coughing 

congestion, headache.  Patient has known brain lesion which is currently being 

evaluated at outside hospital.  The patient states she does have good follow-up 

regarding this.  She is complaining of headache and I did repeat a CAT scan 

which was stable without acute change.  Patient test positive for RSV which is 

the cause of her respiratory complaints.  Regarding her toothache she had a 

draining apical abscess.  Will be placed on antibiotics at this time and should 

follow-up with her dentist.


Undiagnosed new problem with uncertain prognosis?


@  -[No]


Drug Therapy requiring intensive monitoring for toxicity (Heparin, Nitro, Ins

ulin, Cardizem)?


@  -[No]


Were any procedures done?


@  -[No]


Diagnosis/symptom?


@  -[RSV


Acute, or Chronic, or Acute on Chronic?


@  Acute


Uncomplicated (without systemic symptoms) or Complicated (systemic symptoms)?


@  -[Complicated


Side effects of treatment?


@  -[No]


Exacerbation, Progression, or Severe Exacerbation?


@  -[No]


Poses a threat to life or bodily function? How? (Chest pain, USA, MI, pneumonia,

 PE, COPD, DKA, ARF, appy, cholecystitis, CVA, Diverticulitis, Homicidal, 

Suicidal, threat to staff... and all critical care pts)


@  Low risk at this time





- Lab Data


Result diagrams: 


                                 01/02/24 02:27





                                 01/02/24 02:27


                                   Lab Results











  01/02/24 01/02/24 01/02/24 Range/Units





  02:27 02:27 02:27 


 


WBC  13.7 H    (3.8-10.6)  k/uL


 


RBC  4.36    (3.80-5.40)  m/uL


 


Hgb  12.6    (11.4-16.0)  gm/dL


 


Hct  38.6    (34.0-46.0)  %


 


MCV  88.7    (80.0-100.0)  fL


 


MCH  28.9    (25.0-35.0)  pg


 


MCHC  32.6    (31.0-37.0)  g/dL


 


RDW  15.1    (11.5-15.5)  %


 


Plt Count  265    (150-450)  k/uL


 


MPV  8.1    


 


Neutrophils %  82    %


 


Lymphocytes %  11    %


 


Monocytes %  6    %


 


Eosinophils %  0    %


 


Basophils %  0    %


 


Neutrophils #  11.2 H    (1.3-7.7)  k/uL


 


Lymphocytes #  1.5    (1.0-4.8)  k/uL


 


Monocytes #  0.8    (0-1.0)  k/uL


 


Eosinophils #  0.0    (0-0.7)  k/uL


 


Basophils #  0.0    (0-0.2)  k/uL


 


Sodium   137   (137-145)  mmol/L


 


Potassium   3.6   (3.5-5.1)  mmol/L


 


Chloride   102   ()  mmol/L


 


Carbon Dioxide   23   (22-30)  mmol/L


 


Anion Gap   12   mmol/L


 


BUN   18 H   (7-17)  mg/dL


 


Creatinine   0.76   (0.52-1.04)  mg/dL


 


Est GFR (CKD-EPI)AfAm   >90   (>60 ml/min/1.73 sqM)  


 


Est GFR (CKD-EPI)NonAf   >90   (>60 ml/min/1.73 sqM)  


 


Glucose   143 H   (74-99)  mg/dL


 


Plasma Lactic Acid Joo    1.6  (0.7-2.0)  mmol/L


 


Calcium   8.5   (8.4-10.2)  mg/dL


 


Magnesium   1.8   (1.6-2.3)  mg/dL


 


Total Bilirubin   0.4   (0.2-1.3)  mg/dL


 


AST   28   (14-36)  U/L


 


ALT   112 H   (4-34)  U/L


 


Alkaline Phosphatase   65   ()  U/L


 


Total Protein   6.3   (6.3-8.2)  g/dL


 


Albumin   3.8   (3.5-5.0)  g/dL


 


Influenza Type A (PCR)     (Not Detectd)  


 


Influenza Type B (PCR)     (Not Detectd)  


 


RSV (PCR)     (Not Detectd)  


 


SARS-CoV-2 (PCR)     (Not Detectd)  














  01/02/24 Range/Units





  02:27 


 


WBC   (3.8-10.6)  k/uL


 


RBC   (3.80-5.40)  m/uL


 


Hgb   (11.4-16.0)  gm/dL


 


Hct   (34.0-46.0)  %


 


MCV   (80.0-100.0)  fL


 


MCH   (25.0-35.0)  pg


 


MCHC   (31.0-37.0)  g/dL


 


RDW   (11.5-15.5)  %


 


Plt Count   (150-450)  k/uL


 


MPV   


 


Neutrophils %   %


 


Lymphocytes %   %


 


Monocytes %   %


 


Eosinophils %   %


 


Basophils %   %


 


Neutrophils #   (1.3-7.7)  k/uL


 


Lymphocytes #   (1.0-4.8)  k/uL


 


Monocytes #   (0-1.0)  k/uL


 


Eosinophils #   (0-0.7)  k/uL


 


Basophils #   (0-0.2)  k/uL


 


Sodium   (137-145)  mmol/L


 


Potassium   (3.5-5.1)  mmol/L


 


Chloride   ()  mmol/L


 


Carbon Dioxide   (22-30)  mmol/L


 


Anion Gap   mmol/L


 


BUN   (7-17)  mg/dL


 


Creatinine   (0.52-1.04)  mg/dL


 


Est GFR (CKD-EPI)AfAm   (>60 ml/min/1.73 sqM)  


 


Est GFR (CKD-EPI)NonAf   (>60 ml/min/1.73 sqM)  


 


Glucose   (74-99)  mg/dL


 


Plasma Lactic Acid Joo   (0.7-2.0)  mmol/L


 


Calcium   (8.4-10.2)  mg/dL


 


Magnesium   (1.6-2.3)  mg/dL


 


Total Bilirubin   (0.2-1.3)  mg/dL


 


AST   (14-36)  U/L


 


ALT   (4-34)  U/L


 


Alkaline Phosphatase   ()  U/L


 


Total Protein   (6.3-8.2)  g/dL


 


Albumin   (3.5-5.0)  g/dL


 


Influenza Type A (PCR)  Not Detected  (Not Detectd)  


 


Influenza Type B (PCR)  Not Detected  (Not Detectd)  


 


RSV (PCR)  Detected A  (Not Detectd)  


 


SARS-CoV-2 (PCR)  Not Detected  (Not Detectd)  














Disposition


Clinical Impression: 


 RSV (respiratory syncytial virus infection), Brain mass, Toothache





Disposition: HOME SELF-CARE


Condition: Fair


Instructions (If sedation given, give patient instructions):  Dental Abscess 

(ED), Toothache (ED), Respiratory Syncytial Virus (ED)


Prescriptions: 


Amoxic-Pot Clav 875-125Mg [Augmentin 875-125] 1 tab PO Q12HR 10 Days #20 tab


Is patient prescribed a controlled substance at d/c from ED?: No


Referrals: 


Senthil Medina MD [Primary Care Provider] - 1-2 days


Time of Disposition: 06:09

## 2024-01-02 NOTE — CT
EXAM:

  CT Head Without Intravenous Contrast

 

CLINICAL HISTORY:

  ITS.REASON CT Reason: HA

 

TECHNIQUE:

  Axial computed tomography images of the head/brain without intravenous 

contrast.  CTDI is 49.2 mGy and DLP is 1153.4 mGy-cm.  This CT exam was 

performed using one or more of the following dose reduction techniques: 

automated exposure control, adjustment of the mA and/or kV according to 

patient size, and/or use of iterative reconstruction technique.

 

COMPARISON:

12/16/2023.

 

FINDINGS:

  Brain:  No hemorrhage.  Partially calcified mass again seen in the 

right cerebellum

  Ventricles:  No hydrocephalus.

  Bones/joints:  Unremarkable.

  Soft tissues:  Unremarkable.

  Sinuses:  No air fluid level.

  Mastoid air cells:  Clear.

 

IMPRESSION:     

Partially calcified mass again seen in the right cerebellum.  No new 

acute findings.

## 2024-04-08 ENCOUNTER — HOSPITAL ENCOUNTER (OUTPATIENT)
Dept: HOSPITAL 47 - RADMRIMAIN | Age: 29
Discharge: HOME | End: 2024-04-08
Attending: NEUROLOGICAL SURGERY
Payer: COMMERCIAL

## 2024-04-08 DIAGNOSIS — D49.6: Primary | ICD-10-CM

## 2024-04-08 DIAGNOSIS — G93.89: ICD-10-CM

## 2024-04-08 PROCEDURE — 70553 MRI BRAIN STEM W/O & W/DYE: CPT

## 2024-04-10 NOTE — MR
EXAMINATION TYPE: MR brain wo/w con

 

DATE OF EXAM: 4/8/2024

 

COMPARISON: 1/2/2024, 12/16/2023 CT brains

 

HISTORY: Brain tumor

 

CONTRAST:  Performed utilizing 6.5 mL intravenous Gadavist gadolinium contrast.  

 

TECHNIQUE: Multiplanar, multiecho imaging on a 3.0 Koki magnet is performed through the brain.  Stud
y is performed within 24 hours of arrival to the hospital.

 

The craniovertebral junction is normal.  The pituitary is normal.  

 

Diffusion-weighted imaging is performed.  No abnormal hyperintensity is present to suggest an acute i
ntracranial infarct or acute ischemic change.

 

There are several hyperintense areas on T2-weighted sequences and inversion recovery weighted sequenc
es within the right cerebellum and to a lesser degree left cerebellum adjacent to the fourth ventricl
e. This appears to extend into the vermis. Some hypointense areas are present likely corresponding to
 the calcification identified on CT examination. Enhancement is not evident.

 

Signal within the bilateral cerebral hemispheres are normal. No suspicious signal abnormality in the 
supratentorial region is evident. 

 

Ventricles and sulci are appropriate for the patient age. Fourth ventricle appears patent.

 

 

IMPRESSION:

1. Hyperintense T2 and inversion recovery weighted sequence signal within the fourth periventricular 
region extending from the cerebellar peduncles into the vermis. Low signal areas with some blooming a
rtifact corresponding to calcification on CT are present. No enhancement is evident. Findings can be 
compatible with primary neoplasm. Additional workup recommended

## 2024-10-22 ENCOUNTER — HOSPITAL ENCOUNTER (EMERGENCY)
Dept: HOSPITAL 47 - EC | Age: 29
Discharge: HOME | End: 2024-10-22
Payer: COMMERCIAL

## 2024-10-22 VITALS — HEART RATE: 102 BPM | RESPIRATION RATE: 18 BRPM | SYSTOLIC BLOOD PRESSURE: 132 MMHG | DIASTOLIC BLOOD PRESSURE: 87 MMHG

## 2024-10-22 VITALS — TEMPERATURE: 97.7 F

## 2024-10-22 PROCEDURE — 99282 EMERGENCY DEPT VISIT SF MDM: CPT

## 2024-10-22 RX ADMIN — CLINDAMYCIN HYDROCHLORIDE STA MG: 150 CAPSULE ORAL at 23:20

## 2024-10-22 NOTE — ED
Skin/Abscess/FB HPI





- General


Chief complaint: Skin/Abscess/Foreign Body


Stated complaint: Right side facial pain/abcess


Time Seen by Provider: 10/22/24 22:31


Source: patient, RN notes reviewed


Mode of arrival: ambulatory


Limitations: no limitations





- History of Present Illness


Initial comments: 


29-year-old female presents emergency department chief complaint of facial wound

concern for staph infection.  Patient states that she has had in the past.  

Patient that she has a wound under her right side of her face.  She states that 

there was some pus draining out of it.  Patient has a fever chills no difficulty

swallowing no difficulty breathing.








- Related Data


                                Home Medications











 Medication  Instructions  Recorded  Confirmed


 


Albuterol Sulfate [Ventolin HFA] 1 - 2 puff INHALATION RT-Q6H PRN 10/12/19 

12/16/23


 


Dextroamphetamine/Amphetamine 30 mg PO BID 11/30/23 12/16/23





[Adderall]   


 


Clotrimazole/Betameth Cream 1 applic TOPICAL BID 12/16/23 12/16/23





[Lotrisone]   


 


Ibuprofen [Motrin] 600 mg PO Q6HR PRN 12/16/23 12/16/23


 


dexAMETHasone [Decadron] 2 mg PO TID@1000,1800,0000 12/16/23 12/16/23


 


diazePAM [Diazepam] 2 mg PO BID 12/16/23 12/16/23








                                  Previous Rx's











 Medication  Instructions  Recorded


 


Amoxic-Pot Clav 875-125Mg 1 tab PO Q12HR 10 Days #20 tab 01/02/24





[Augmentin 875-125]  


 


clindamycin  mg PO QID #40 cap 10/22/24











                                    Allergies











Allergy/AdvReac Type Severity Reaction Status Date / Time


 


methylphenidate HCl Allergy Mild Unknown Verified 10/22/24 22:29





[From Metadate CD]     


 


cephalexin [From Keflex] Allergy Unknown Rash/Hives Verified 10/22/24 22:29


 


trimethoprim [From Bactrim] Allergy Unknown Rash/Hives Verified 10/22/24 22:29


 


azithromycin Allergy  swelling Verified 10/22/24 22:29





   of mouth  


 


ciprofloxacin [From Cipro] Allergy  Rash/Hives Verified 10/22/24 22:29


 


ketorolac [From Toradol] Allergy  Unknown Verified 10/22/24 22:29


 


sulfamethoxazole Allergy  Rash/Hives Verified 10/22/24 22:29





[From Bactrim]     














Review of Systems


ROS Statement: 


Those systems with pertinent positive or pertinent negative responses have been 

documented in the HPI.





ROS Other: All systems not noted in ROS Statement are negative.





Past Medical History


Past Medical History: Asthma


Additional Past Medical History / Comment(s): hypoglycemia, elevated liver 

enzymes, brain lesions


History of Any Multi-Drug Resistant Organisms: None Reported


Past Surgical History: Tonsillectomy


Additional Past Surgical History / Comment(s): oral surgery


Past Anesthesia/Blood Transfusion Reactions: No Reported Reaction


Past Psychological History: ADD/ADHD, Anxiety, Bipolar, Depression


Smoking Status: Vaper


Past Alcohol Use History: Occasional


Past Drug Use History: Marijuana





- Past Family History


  ** Mother


Family Medical History: Asthma, Thyroid Disorder





General Exam


Limitations: no limitations


General appearance: alert, in no apparent distress


Head exam: Present: atraumatic, normocephalic, normal inspection


Eye exam: Present: normal appearance, PERRL, EOMI.  Absent: scleral icterus, 

conjunctival injection, periorbital swelling


ENT exam: Present: normal exam, normal oropharynx, mucous membranes moist


Neck exam: Present: normal inspection.  Absent: tenderness, meningismus, 

lymphadenopathy


Respiratory exam: Present: normal lung sounds bilaterally.  Absent: respiratory 

distress, wheezes, rales, rhonchi, stridor


Cardiovascular Exam: Present: regular rate, normal rhythm, normal heart sounds. 

Absent: systolic murmur, diastolic murmur, rubs, gallop, clicks


Skin exam: Present: warm, dry, intact, normal color, other (Right-sided facial 

under the jawline there is a 1 cm oval open wound).  Absent: rash





Course


                                   Vital Signs











  10/22/24





  22:25


 


Temperature 97.7 F


 


Pulse Rate 118 H


 


Respiratory 20





Rate 


 


Blood Pressure 142/94


 


O2 Sat by Pulse 100





Oximetry 














Medical Decision Making





- Medical Decision Making


Was pt. sent in by a medical professional or institution (, PA, NP, urgent ca

re, hospital, or nursing home...) When possible be specific


@ -No


Did you speak to anyone other than the patient for history (EMS, parent, family,

police, friend...)? What history was obtained from this source 


@ -No


Did you review nursing and triage notes (agree or disagree)? Why? 


@ -I reviewed and agree with nursing and triage notes


Were old charts reviewed (outside hosp., previous admission, EMS record, old 

EKG, old radiological studies, urgent care reports/EKG's, nursing home records)?

Report findings 


@ -No old charts were reviewed


Differential Diagnosis (chest pain, altered mental status, abdominal pain women,

abdominal pain men, vaginal bleeding, weakness, fever, dyspnea, syncope, 

headache, dizziness, GI bleed, back pain, seizure, CVA, palpatations, mental 

health, musculoskeletal)? 


@ -Cellulitis, open wound, acne


EKG interpreted by me (3pts min.).


@ -[None


X-rays interpreted by me (1pt min.).


@ -None done


CT interpreted by me (1pt min.).


@ -None done


U/S interpreted by me (1pt. min.).


@ -None done


What testing was considered but not performed or refused? (CT, X-rays, U/S, 

labs)? Why?


@ -None


What meds were considered but not given or refused? Why?


@ -None


Did you discuss the management of the patient with other professionals 

(professionals i.e. , PA, NP, lab, RT, psych nurse, , , 

teacher, , )? Give summary


@ -No


Was smoking cessation discussed for >3mins.?


@ -No


Was critical care preformed (if so, how long)?


@ -No


Were there social determinants of health that impacted care today? How? 

(Homelessness, low income, unemployed, alcoholism, drug addiction, 

transportation, low edu. Level, literacy, decrease access to med. care, [facility], 

rehab)?


@ -No


Was there de-escalation of care discussed even if they declined (Discuss DNR or 

withdrawal of care, Hospice)? DNR status


@ -No


What co-morbidities impacted this encounter? (DM, HTN, Smoking, COPD, CAD, 

Cancer, CVA, ARF, Chemo, Hep., AIDS, mental health diagnosis, sleep apnea, 

morbid obesity)?


@ -None


Was patient admitted / discharged? Hospital course, mention meds given and 

route, prescriptions, significant lab abnormalities, going to OR and other 

pertinent info.


@ -Discharge patient has of open wound noted patient has history of MRSA started

 on clindamycin return parameters discussed.


Undiagnosed new problem with uncertain prognosis?


@ -No


Drug Therapy requiring intensive monitoring for toxicity (Heparin, Nitro, 

Insulin, Cardizem)?


@ -No


Were any procedures done?


@ -No


Diagnosis/symptom?


@ -Facial wound


Acute, or Chronic, or Acute on Chronic?


@ -Acute


Uncomplicated (without systemic symptoms) or Complicated (systemic symptoms)?


@ -Uncomplicated


Side effects of treatment?


@ -No


Exacerbation, Progression, or Severe Exacerbation?


@ -No


Poses a threat to life or bodily function? How? (Chest pain, USA, MI, pneumonia,

 PE, COPD, DKA, ARF, appy, cholecystitis, CVA, Diverticulitis, Homicidal, 

Suicidal, threat to staff... and all critical care pts)


@ -No








Disposition


Clinical Impression: 


 Open facial wound





Disposition: HOME SELF-CARE


Condition: Stable


Instructions (If sedation given, give patient instructions):  Acute Wound Care 

(ED)


Additional Instructions: 


Please return to the Emergency Department if symptoms worsen or any other 

concerns.


Prescriptions: 


clindamycin  mg PO QID #40 cap


Is patient prescribed a controlled substance at d/c from ED?: No


Referrals: 


Zaid Mcgill MD [Primary Care Provider] - 1-2 days


Time of Disposition: 23:08

## 2025-01-11 ENCOUNTER — HOSPITAL ENCOUNTER (EMERGENCY)
Dept: HOSPITAL 47 - EC | Age: 30
Discharge: HOME | End: 2025-01-11
Payer: COMMERCIAL

## 2025-01-11 VITALS — DIASTOLIC BLOOD PRESSURE: 80 MMHG | RESPIRATION RATE: 18 BRPM | HEART RATE: 90 BPM | SYSTOLIC BLOOD PRESSURE: 120 MMHG

## 2025-01-11 VITALS — TEMPERATURE: 97.9 F

## 2025-01-11 DIAGNOSIS — Z88.1: ICD-10-CM

## 2025-01-11 DIAGNOSIS — Z88.8: ICD-10-CM

## 2025-01-11 DIAGNOSIS — Z88.2: ICD-10-CM

## 2025-01-11 DIAGNOSIS — F17.290: ICD-10-CM

## 2025-01-11 DIAGNOSIS — J06.9: Primary | ICD-10-CM

## 2025-01-11 PROCEDURE — 99284 EMERGENCY DEPT VISIT MOD MDM: CPT

## 2025-01-11 PROCEDURE — 87636 SARSCOV2 & INF A&B AMP PRB: CPT

## 2025-01-11 PROCEDURE — 71046 X-RAY EXAM CHEST 2 VIEWS: CPT

## 2025-01-11 PROCEDURE — 87651 STREP A DNA AMP PROBE: CPT

## 2025-01-11 NOTE — XR
EXAM:

  XR Chest, 2 Views

 

CLINICAL HISTORY:

  ITS.REASON XR Reason: cough

 

TECHNIQUE:

  Frontal and lateral views of the chest.

 

COMPARISON:

  11/30/2023.

 

FINDINGS:

  Lungs:  Unremarkable.  No consolidative changes.

  Pleural space:  Unremarkable.  No pneumothorax.  No pleural effusions.

  Heart:  Heart is normal in size.  No cardiomegaly.

  Mediastinum:  Unremarkable.  Normal mediastinal contour.

  Bones/joints:  Dextroscoliosis.  The osseous structures and soft 

tissues are unremarkable.  No acute fracture.

 

IMPRESSION:     

  No consolidative changes or pleural effusions, unchanged.

## 2025-01-11 NOTE — ED
General Adult HPI





- General


Chief complaint: Upper Respiratory Infection


Stated complaint: Possible Covid 1/2


Time Seen by Provider: 01/11/25 01:04


Source: patient


Mode of arrival: ambulatory


Limitations: no limitations





- History of Present Illness


Initial comments: 


29-year-old female presenting with chief complaint of URI-like symptoms.  

Patient just returned from [place] where she was exposed to COVID and influenza.

 She is complaining of cough, congestion, headache, sore throat.  No nausea 

vomiting or abdominal pain.  No chest pain or dyspnea.  No fever or chills.








- Related Data


                                Home Medications











 Medication  Instructions  Recorded  Confirmed


 


Albuterol Sulfate [Ventolin HFA] 1 - 2 puff INHALATION RT-Q6H PRN 10/12/19 

12/16/23


 


Dextroamphetamine/Amphetamine 30 mg PO BID 11/30/23 12/16/23





[Adderall]   


 


Clotrimazole/Betameth Cream 1 applic TOPICAL BID 12/16/23 12/16/23





[Lotrisone]   


 


Ibuprofen [Motrin] 600 mg PO Q6HR PRN 12/16/23 12/16/23


 


dexAMETHasone [Decadron] 2 mg PO TID@1000,1800,0000 12/16/23 12/16/23


 


diazePAM [Diazepam] 2 mg PO BID 12/16/23 12/16/23








                                  Previous Rx's











 Medication  Instructions  Recorded


 


Amoxic-Pot Clav 875-125Mg 1 tab PO Q12HR 10 Days #20 tab 01/02/24





[Augmentin 875-125]  


 


clindamycin  mg PO QID #40 cap 10/22/24


 


Albuterol Nebulized [Ventolin 2.5 mg INHALATION Q4H PRN 8 Days 01/11/25





Nebulized] #150 ml 


 


predniSONE [Deltasone] 20 mg PO DAILY 4 Days #4 tab 01/11/25











                                    Allergies











Allergy/AdvReac Type Severity Reaction Status Date / Time


 


methylphenidate HCl Allergy Mild Unknown Verified 10/22/24 22:29





[From Metadate CD]     


 


cephalexin [From Keflex] Allergy Unknown Rash/Hives Verified 10/22/24 22:29


 


trimethoprim [From Bactrim] Allergy Unknown Rash/Hives Verified 10/22/24 22:29


 


azithromycin Allergy  swelling Verified 10/22/24 22:29





   of mouth  


 


ciprofloxacin [From Cipro] Allergy  Rash/Hives Verified 10/22/24 22:29


 


ketorolac [From Toradol] Allergy  Unknown Verified 10/22/24 22:29


 


sulfamethoxazole Allergy  Rash/Hives Verified 10/22/24 22:29





[From Bactrim]     














Review of Systems


ROS Statement: 


Those systems with pertinent positive or pertinent negative responses have been 

documented in the HPI.





ROS Other: All systems not noted in ROS Statement are negative.





Past Medical History


Past Medical History: Asthma


Additional Past Medical History / Comment(s): hypoglycemia, elevated liver 

enzymes, brain lesions


History of Any Multi-Drug Resistant Organisms: None Reported


Past Surgical History: Tonsillectomy


Additional Past Surgical History / Comment(s): oral surgery


Past Anesthesia/Blood Transfusion Reactions: No Reported Reaction


Past Psychological History: ADD/ADHD, Anxiety, Bipolar, Depression


Smoking Status: Vaper


Past Alcohol Use History: Occasional


Past Drug Use History: Marijuana





- Past Family History


  ** Mother


Family Medical History: Asthma, Thyroid Disorder





General Exam


Limitations: no limitations


General appearance: alert, in no apparent distress


Head exam: Present: atraumatic, normocephalic, normal inspection


Eye exam: Present: normal appearance, EOMI


Neck exam: Present: normal inspection.  Absent: meningismus


Respiratory exam: Present: normal lung sounds bilaterally.  Absent: respiratory 

distress, wheezes, rales, rhonchi, stridor


Cardiovascular Exam: Present: regular rate, normal rhythm, normal heart sounds. 

Absent: systolic murmur, diastolic murmur, rubs, gallop, clicks


Neurological exam: Present: alert, oriented X3


Psychiatric exam: Present: normal affect, normal mood


Skin exam: Present: warm, dry





Course


                                   Vital Signs











  01/11/25





  00:59


 


Temperature 97.9 F


 


Pulse Rate 112 H


 


Respiratory 16





Rate 


 


Blood Pressure 136/103


 


O2 Sat by Pulse 100





Oximetry 














Medical Decision Making





- Medical Decision Making


Was pt. sent in by a medical professional or institution (, PA, NP, urgent 

care, hospital, or nursing home...) When possible be specific


@  -[No]


Did you speak to anyone other than the patient for history (EMS, parent, family,

 police, friend...)? What history was obtained from this source 


@  -[No]


Did you review nursing and triage notes (agree or disagree)?  Why? 


@  -[I reviewed and agree with nursing and triage notes]


Were old charts reviewed (outside hosp., previous admission, EMS record, old 

EKG, old radiological studies, urgent care reports/EKG's, nursing home records)?

Report findings 


@  -[No old charts were reviewed]


Differential Diagnosis (chest pain, altered mental status, abdominal pain women,

abdominal pain men, vaginal bleeding, weakness, fever, dyspnea, syncope, 

headache, dizziness, GI bleed, back pain, seizure, CVA, palpatations, mental 

health, musculoskeletal)? 


@  -Differential includes COVID, influenza, RSV, pneumonia, bronchitis, asthma 

exacerbation, this is not an all-inclusive list


EKG interpreted by me (3pts min.).


@  -[As above]


X-rays interpreted by me (1pt min.).


@  -By my interpretation there is no acute process on chest x-ray


CT interpreted by me (1pt min.).


@  -[None done]


U/S interpreted by me (1pt. min.).


@  -[None done]


What testing was considered but not performed or refused? (CT, X-rays, U/S, 

labs)? Why?


@  -[None]


What meds were considered but not given or refused? Why?


@  -[None]


Did you discuss the management of the patient with other professionals 

(professionals i.e. , PA, NP, lab, RT, psych nurse, , , 

teacher, , )? Give summary


@  -[No]


Was smoking cessation discussed for >3mins.?


@  -[No]


Was critical care preformed (if so, how long)?


@  -[No]


Were there social determinants of health that impacted care today? How? 

(Homelessness, low income, unemployed, alcoholism, drug addiction, 

transportation, low edu. Level, literacy, decrease access to med. care, long term, 

rehab)?


@  -[No]


Was there de-escalation of care discussed even if they declined (Discuss DNR or 

withdrawal of care, Hospice)? DNR status


@  -[No]


What co-morbidities impacted this encounter? (DM, HTN, Smoking, COPD, CAD, 

Cancer, CVA, ARF, Chemo, Hep., AIDS, mental health diagnosis, sleep apnea, 

morbid obesity)?


@  -[None]


Was patient admitted / discharged? Hospital course, mention meds given and 

route, prescriptions, significant lab abnormalities, going to OR and other 

pertinent info.


@  -29-year-old female with URI-like symptoms.  History and physical examination

 are conducted.  She is negative for COVID, influenza, RSV, group A strep.  

Chest x-ray shows no evidence of consolidation.  Patient is educated on today's 

findings.  She sent refills for her albuterol nebulizer and a 4-day course of 

prednisone.  Follow-up with PCP.  Report back to ER with any new or worsening 

symptoms.  Discussed return parameters and answered all questions.  Patient 

conveyed verbal understanding and agreed to the plan.  I discussed this case in 

detail with my attending Dr. Conklin


Undiagnosed new problem with uncertain prognosis?


@  -[No]


Drug Therapy requiring intensive monitoring for toxicity (Heparin, Nitro, 

Insulin, Cardizem)?


@  -[No]


Were any procedures done?


@  -[No]


Diagnosis/symptom?


@  -URI


Acute, or Chronic, or Acute on Chronic?


@  -Acute


Uncomplicated (without systemic symptoms) or Complicated (systemic symptoms)?


@  -Uncomplicated


Side effects of treatment?


@  -[No]


Exacerbation, Progression, or Severe Exacerbation?


@  -[No]


Poses a threat to life or bodily function? How? (Chest pain, USA, MI, pneumonia,

 PE, COPD, DKA, ARF, appy, cholecystitis, CVA, Diverticulitis, Homicidal, 

Suicidal, threat to staff... and all critical care pts)


@  -Low likelihood











- Lab Data


                                   Lab Results











  01/11/25 01/11/25 Range/Units





  01:10 01:10 


 


Influenza Type A (PCR)   Not Detected  (Not Detectd)  


 


Influenza Type B (PCR)   Not Detected  (Not Detectd)  


 


RSV (PCR)   Not Detected  (Not Detectd)  


 


SARS-CoV-2 (PCR)   Not Detected  (Not Detectd)  


 


Group A Strep (PCR)  NOT DETECTED   (Not Detectd)  














Disposition


Clinical Impression: 


 Upper respiratory tract infection





Disposition: HOME SELF-CARE


Condition: Good


Instructions (If sedation given, give patient instructions):  Upper Respiratory 

Infection (ED)


Additional Instructions: 


Follow-up with PCP.  Report back to ER with any new or worsening symptoms.


Prescriptions: 


predniSONE [Deltasone] 20 mg PO DAILY 4 Days #4 tab


Albuterol Nebulized [Ventolin Nebulized] 2.5 mg INHALATION Q4H PRN 8 Days #150 

ml


 PRN Reason: Shortness Of Breath


Is patient prescribed a controlled substance at d/c from ED?: No


Referrals: 


Nadege Zelaya FNPBC [Primary Care Provider] - 1-2 days


Time of Disposition: 02:29

## 2025-01-24 NOTE — XR
EXAMINATION TYPE: XR ankle complete LT

 

DATE OF EXAM: 12/19/2020

 

COMPARISON: 12/19/2014

 

HISTORY: Pain. Fall.

 

TECHNIQUE: 3 views

 

FINDINGS: There is soft tissue swelling over the lateral malleolus. Ankle mortise is anatomic. I see 
no fracture nor dislocation. Joint spaces are normal.

 

IMPRESSION: Lateral soft tissue swelling. No fracture seen.
EXAMINATION TYPE: XR foot complete LT

 

DATE OF EXAM: 12/19/2020

 

COMPARISON: 10/21/2015

 

HISTORY: Pain

 

TECHNIQUE: 3 views

 

FINDINGS: Metatarsals appear intact. I see no fracture nor dislocation. Joint spaces are fairly teresita
l.

 

IMPRESSION: Negative left foot exam. No fracture seen.
s/p allergic reaction yesterday to Excedrin, pt c/o head/jaw pain that started X 2 days ago. says "pain starts in forehead and goes into jaw". pt unsure if dental related, says has had all 4 wisdom teeth removed. Phx anemia

## 2025-04-08 ENCOUNTER — HOSPITAL ENCOUNTER (EMERGENCY)
Dept: HOSPITAL 47 - EC | Age: 30
Discharge: HOME | End: 2025-04-08
Payer: COMMERCIAL

## 2025-04-08 VITALS — RESPIRATION RATE: 17 BRPM | DIASTOLIC BLOOD PRESSURE: 73 MMHG | SYSTOLIC BLOOD PRESSURE: 131 MMHG | HEART RATE: 86 BPM

## 2025-04-08 VITALS — TEMPERATURE: 98.2 F

## 2025-04-08 DIAGNOSIS — O99.331: ICD-10-CM

## 2025-04-08 DIAGNOSIS — O20.0: Primary | ICD-10-CM

## 2025-04-08 DIAGNOSIS — Z88.2: ICD-10-CM

## 2025-04-08 DIAGNOSIS — Z88.6: ICD-10-CM

## 2025-04-08 DIAGNOSIS — Z88.8: ICD-10-CM

## 2025-04-08 DIAGNOSIS — F17.290: ICD-10-CM

## 2025-04-08 LAB
ALBUMIN SERPL-MCNC: 4.3 G/DL (ref 3.5–5)
ALP SERPL-CCNC: 69 U/L (ref 38–126)
ALT SERPL-CCNC: 39 U/L (ref 4–34)
ANION GAP SERPL CALC-SCNC: 4 MMOL/L
AST SERPL-CCNC: 32 U/L (ref 14–36)
BASOPHILS # BLD AUTO: 0.06 10*3/UL (ref 0–0.1)
BASOPHILS NFR BLD AUTO: 0.8 %
BUN SERPL-SCNC: 10 MG/DL (ref 7–17)
CALCIUM SPEC-MCNC: 9.5 MG/DL (ref 8.4–10.2)
CHLORIDE SERPL-SCNC: 101 MMOL/L (ref 98–107)
CO2 SERPL-SCNC: 29 MMOL/L (ref 22–30)
EOSINOPHIL # BLD AUTO: 0.17 10*3/UL (ref 0.04–0.35)
EOSINOPHIL NFR BLD AUTO: 2.3 %
ERYTHROCYTE [DISTWIDTH] IN BLOOD: 13.2 % (ref 11.5–14.5)
GLUCOSE SERPL-MCNC: 96 MG/DL (ref 74–99)
HCT VFR BLD AUTO: 44.5 % (ref 37.2–46.3)
HGB BLD-MCNC: 15.1 G/DL (ref 12–15)
LYMPHOCYTES # SPEC AUTO: 2.05 10*3/UL (ref 0.9–5)
LYMPHOCYTES NFR SPEC AUTO: 27.6 %
MCH RBC QN AUTO: 29.6 PG (ref 27–32)
MCHC RBC AUTO-ENTMCNC: 33.9 G/DL (ref 32–37)
MCV RBC AUTO: 87.3 FL (ref 80–97)
MONOCYTES # BLD AUTO: 0.72 10*3/UL (ref 0.2–1)
MONOCYTES NFR BLD AUTO: 9.7 %
NEUTROPHILS # BLD AUTO: 4.38 10*3/UL (ref 1.8–7.7)
NEUTROPHILS NFR BLD AUTO: 58.8 %
PLATELET # BLD AUTO: 255 10*3/UL (ref 140–440)
POTASSIUM SERPL-SCNC: 4.5 MMOL/L (ref 3.5–5.1)
PROT SERPL-MCNC: 7.2 G/DL (ref 6.3–8.2)
PROT UR QL: (no result)
RBC UR QL: >182 /HPF (ref 0–5)
SODIUM SERPL-SCNC: 134 MMOL/L (ref 137–145)
SP GR UR: 1.03 (ref 1–1.03)
SQUAMOUS UR QL AUTO: 15 /HPF (ref 0–4)
UROBILINOGEN UR QL STRIP: <2 MG/DL (ref ?–2)
WBC # BLD AUTO: 7.44 10*3/UL (ref 4.5–10)
WBC # UR AUTO: 11 /HPF (ref 0–5)

## 2025-04-08 PROCEDURE — 81001 URINALYSIS AUTO W/SCOPE: CPT

## 2025-04-08 PROCEDURE — 80053 COMPREHEN METABOLIC PANEL: CPT

## 2025-04-08 PROCEDURE — 36415 COLL VENOUS BLD VENIPUNCTURE: CPT

## 2025-04-08 PROCEDURE — 76801 OB US < 14 WKS SINGLE FETUS: CPT

## 2025-04-08 PROCEDURE — 85025 COMPLETE CBC W/AUTO DIFF WBC: CPT

## 2025-04-08 PROCEDURE — 99284 EMERGENCY DEPT VISIT MOD MDM: CPT

## 2025-04-08 PROCEDURE — 84702 CHORIONIC GONADOTROPIN TEST: CPT

## 2025-04-08 PROCEDURE — 96374 THER/PROPH/DIAG INJ IV PUSH: CPT

## 2025-04-08 PROCEDURE — 86901 BLOOD TYPING SEROLOGIC RH(D): CPT

## 2025-04-08 PROCEDURE — 96361 HYDRATE IV INFUSION ADD-ON: CPT

## 2025-04-08 PROCEDURE — 81025 URINE PREGNANCY TEST: CPT

## 2025-04-08 PROCEDURE — 86900 BLOOD TYPING SEROLOGIC ABO: CPT

## 2025-04-08 PROCEDURE — 87086 URINE CULTURE/COLONY COUNT: CPT

## 2025-04-08 RX ADMIN — CEFAZOLIN STA MLS/HR: 330 INJECTION, POWDER, FOR SOLUTION INTRAMUSCULAR; INTRAVENOUS at 19:22

## 2025-04-08 RX ADMIN — ACETAMINOPHEN AND CODEINE PHOSPHATE STA EACH: 300; 30 TABLET ORAL at 21:29

## 2025-04-08 RX ADMIN — ACETAMINOPHEN STA MG: 500 TABLET ORAL at 19:22

## 2025-04-08 RX ADMIN — HYDROMORPHONE HYDROCHLORIDE STA MG: 1 INJECTION, SOLUTION INTRAMUSCULAR; INTRAVENOUS; SUBCUTANEOUS at 20:36

## 2025-04-24 ENCOUNTER — HOSPITAL ENCOUNTER (EMERGENCY)
Dept: HOSPITAL 47 - EC | Age: 30
Discharge: LEFT BEFORE DISCHARGE | End: 2025-04-24
Payer: COMMERCIAL

## 2025-04-24 VITALS
RESPIRATION RATE: 18 BRPM | DIASTOLIC BLOOD PRESSURE: 85 MMHG | SYSTOLIC BLOOD PRESSURE: 125 MMHG | HEART RATE: 93 BPM | TEMPERATURE: 98 F

## 2025-04-24 DIAGNOSIS — Z53.29: Primary | ICD-10-CM

## 2025-04-24 PROCEDURE — 99499 UNLISTED E&M SERVICE: CPT
